# Patient Record
Sex: MALE | Race: WHITE | Employment: OTHER | URBAN - NONMETROPOLITAN AREA
[De-identification: names, ages, dates, MRNs, and addresses within clinical notes are randomized per-mention and may not be internally consistent; named-entity substitution may affect disease eponyms.]

---

## 2019-04-03 LAB
ALBUMIN SERPL-MCNC: NORMAL G/DL
ALP BLD-CCNC: NORMAL U/L
ALT SERPL-CCNC: NORMAL U/L
ANION GAP SERPL CALCULATED.3IONS-SCNC: NORMAL MMOL/L
AST SERPL-CCNC: NORMAL U/L
BILIRUB SERPL-MCNC: NORMAL MG/DL
BUN BLDV-MCNC: NORMAL MG/DL
CALCIUM SERPL-MCNC: NORMAL MG/DL
CHLORIDE BLD-SCNC: NORMAL MMOL/L
CHOLESTEROL, TOTAL: NORMAL
CHOLESTEROL/HDL RATIO: NORMAL
CO2: NORMAL
CREAT SERPL-MCNC: NORMAL MG/DL
GFR CALCULATED: NORMAL
GLUCOSE BLD-MCNC: NORMAL MG/DL
HDLC SERPL-MCNC: NORMAL MG/DL
LDL CHOLESTEROL CALCULATED: NORMAL
POTASSIUM SERPL-SCNC: NORMAL MMOL/L
SODIUM BLD-SCNC: NORMAL MMOL/L
TOTAL PROTEIN: NORMAL
TRIGL SERPL-MCNC: NORMAL MG/DL
VLDLC SERPL CALC-MCNC: NORMAL MG/DL

## 2019-09-11 ENCOUNTER — OFFICE VISIT (OUTPATIENT)
Dept: FAMILY MEDICINE CLINIC | Age: 79
End: 2019-09-11
Payer: MEDICARE

## 2019-09-11 VITALS
WEIGHT: 182 LBS | DIASTOLIC BLOOD PRESSURE: 82 MMHG | SYSTOLIC BLOOD PRESSURE: 148 MMHG | BODY MASS INDEX: 26.96 KG/M2 | TEMPERATURE: 97.9 F | HEART RATE: 65 BPM | OXYGEN SATURATION: 95 % | HEIGHT: 69 IN

## 2019-09-11 DIAGNOSIS — H61.22 IMPACTED CERUMEN OF LEFT EAR: Primary | ICD-10-CM

## 2019-09-11 PROCEDURE — 90653 IIV ADJUVANT VACCINE IM: CPT | Performed by: PHYSICIAN ASSISTANT

## 2019-09-11 PROCEDURE — G0008 ADMIN INFLUENZA VIRUS VAC: HCPCS | Performed by: PHYSICIAN ASSISTANT

## 2019-09-11 PROCEDURE — 69209 REMOVE IMPACTED EAR WAX UNI: CPT | Performed by: PHYSICIAN ASSISTANT

## 2019-09-11 PROCEDURE — 99212 OFFICE O/P EST SF 10 MIN: CPT | Performed by: PHYSICIAN ASSISTANT

## 2019-10-01 ENCOUNTER — OFFICE VISIT (OUTPATIENT)
Dept: FAMILY MEDICINE CLINIC | Age: 79
End: 2019-10-01
Payer: MEDICARE

## 2019-10-01 VITALS
SYSTOLIC BLOOD PRESSURE: 158 MMHG | DIASTOLIC BLOOD PRESSURE: 68 MMHG | TEMPERATURE: 97.3 F | HEART RATE: 89 BPM | WEIGHT: 177.8 LBS | BODY MASS INDEX: 25.45 KG/M2 | OXYGEN SATURATION: 94 % | HEIGHT: 70 IN

## 2019-10-01 DIAGNOSIS — J43.1 PANLOBULAR EMPHYSEMA (HCC): ICD-10-CM

## 2019-10-01 DIAGNOSIS — I15.8 OTHER SECONDARY HYPERTENSION: Primary | ICD-10-CM

## 2019-10-01 DIAGNOSIS — C61 PROSTATE CANCER (HCC): ICD-10-CM

## 2019-10-01 DIAGNOSIS — E78.41 ELEVATED LIPOPROTEIN(A): ICD-10-CM

## 2019-10-01 PROCEDURE — 99214 OFFICE O/P EST MOD 30 MIN: CPT | Performed by: FAMILY MEDICINE

## 2019-10-01 RX ORDER — DILTIAZEM HYDROCHLORIDE 180 MG/1
180 CAPSULE, EXTENDED RELEASE ORAL DAILY
Qty: 90 CAPSULE | Refills: 1 | Status: SHIPPED | OUTPATIENT
Start: 2019-10-01 | End: 2019-10-01

## 2019-10-01 RX ORDER — ASPIRIN 325 MG
325 TABLET ORAL DAILY
COMMUNITY

## 2019-10-01 RX ORDER — DILTIAZEM HYDROCHLORIDE 240 MG/1
240 CAPSULE, COATED, EXTENDED RELEASE ORAL DAILY
Qty: 90 CAPSULE | Refills: 1 | Status: SHIPPED | OUTPATIENT
Start: 2019-10-01 | End: 2019-11-11

## 2019-10-01 RX ORDER — BENAZEPRIL HYDROCHLORIDE 20 MG/1
20 TABLET ORAL DAILY
Qty: 90 TABLET | Refills: 1 | Status: SHIPPED
Start: 2019-10-01 | End: 2020-03-25 | Stop reason: SDUPTHER

## 2019-10-01 RX ORDER — PREDNISONE 10 MG/1
10 TABLET ORAL DAILY
Refills: 0 | COMMUNITY
Start: 2019-09-26 | End: 2019-12-04 | Stop reason: SDUPTHER

## 2019-10-01 RX ORDER — PRAVASTATIN SODIUM 40 MG
40 TABLET ORAL DAILY
Qty: 90 TABLET | Refills: 1 | Status: SHIPPED | OUTPATIENT
Start: 2019-10-01 | End: 2019-10-02 | Stop reason: SDUPTHER

## 2019-10-01 ASSESSMENT — ENCOUNTER SYMPTOMS
ABDOMINAL PAIN: 0
SINUS PAIN: 0
COUGH: 1
VOMITING: 0
DIARRHEA: 0
CONSTIPATION: 0
SHORTNESS OF BREATH: 1
EYE REDNESS: 0
PHOTOPHOBIA: 0
BLOOD IN STOOL: 0
WHEEZING: 1
BACK PAIN: 0
TROUBLE SWALLOWING: 0
SORE THROAT: 0

## 2019-10-01 ASSESSMENT — PATIENT HEALTH QUESTIONNAIRE - PHQ9
SUM OF ALL RESPONSES TO PHQ QUESTIONS 1-9: 0
1. LITTLE INTEREST OR PLEASURE IN DOING THINGS: 0
2. FEELING DOWN, DEPRESSED OR HOPELESS: 0
SUM OF ALL RESPONSES TO PHQ QUESTIONS 1-9: 0
SUM OF ALL RESPONSES TO PHQ9 QUESTIONS 1 & 2: 0

## 2019-10-02 DIAGNOSIS — E78.41 ELEVATED LIPOPROTEIN(A): ICD-10-CM

## 2019-10-02 RX ORDER — PRAVASTATIN SODIUM 40 MG
40 TABLET ORAL DAILY
Qty: 90 TABLET | Refills: 1 | Status: SHIPPED
Start: 2019-10-02 | End: 2020-03-25 | Stop reason: SDUPTHER

## 2019-11-11 RX ORDER — DILTIAZEM HYDROCHLORIDE 180 MG/1
180 CAPSULE, COATED, EXTENDED RELEASE ORAL DAILY
Qty: 90 CAPSULE | Refills: 1 | Status: SHIPPED
Start: 2019-11-11 | End: 2020-03-25 | Stop reason: SDUPTHER

## 2019-12-05 ENCOUNTER — TELEPHONE (OUTPATIENT)
Dept: FAMILY MEDICINE CLINIC | Age: 79
End: 2019-12-05

## 2019-12-05 RX ORDER — LORAZEPAM 0.5 MG/1
0.5 TABLET ORAL 2 TIMES DAILY
Qty: 60 TABLET | Refills: 0 | OUTPATIENT
Start: 2019-12-05 | End: 2020-01-04

## 2019-12-05 RX ORDER — LORAZEPAM 0.5 MG/1
0.5 TABLET ORAL 2 TIMES DAILY
COMMUNITY
End: 2021-03-11

## 2020-01-22 ENCOUNTER — TELEPHONE (OUTPATIENT)
Dept: FAMILY MEDICINE CLINIC | Age: 80
End: 2020-01-22

## 2020-02-04 ENCOUNTER — TELEPHONE (OUTPATIENT)
Dept: FAMILY MEDICINE CLINIC | Age: 80
End: 2020-02-04

## 2020-02-04 RX ORDER — FUROSEMIDE 20 MG/1
20 TABLET ORAL DAILY
Qty: 60 TABLET | Refills: 1 | Status: SHIPPED
Start: 2020-02-04 | End: 2020-04-10 | Stop reason: SDUPTHER

## 2020-02-04 NOTE — TELEPHONE ENCOUNTER
Patient notified. He states he hasn't taken it in awhile since he only takes it when needed. Patient states he is currently having swelling in his legs and feet. It comes and goes and he states he was told to take Lasix as needed for this. Patient states he is unable to come in until March because it is flu season and he has copd. He wants to know what you want him to do?    Please advise, thank you

## 2020-03-06 ENCOUNTER — TELEPHONE (OUTPATIENT)
Dept: FAMILY MEDICINE CLINIC | Age: 80
End: 2020-03-06

## 2020-03-06 ENCOUNTER — OFFICE VISIT (OUTPATIENT)
Dept: FAMILY MEDICINE CLINIC | Age: 80
End: 2020-03-06
Payer: MEDICARE

## 2020-03-06 VITALS
SYSTOLIC BLOOD PRESSURE: 132 MMHG | WEIGHT: 181.5 LBS | TEMPERATURE: 97.4 F | OXYGEN SATURATION: 94 % | HEART RATE: 79 BPM | BODY MASS INDEX: 26.42 KG/M2 | DIASTOLIC BLOOD PRESSURE: 66 MMHG

## 2020-03-06 PROBLEM — J44.9 COPD (CHRONIC OBSTRUCTIVE PULMONARY DISEASE) (HCC): Status: ACTIVE | Noted: 2020-03-06

## 2020-03-06 PROCEDURE — 99214 OFFICE O/P EST MOD 30 MIN: CPT | Performed by: FAMILY MEDICINE

## 2020-03-06 PROCEDURE — 96372 THER/PROPH/DIAG INJ SC/IM: CPT | Performed by: FAMILY MEDICINE

## 2020-03-06 RX ORDER — CEPHALEXIN 500 MG/1
500 CAPSULE ORAL 3 TIMES DAILY
Qty: 21 CAPSULE | Refills: 0 | Status: SHIPPED | OUTPATIENT
Start: 2020-03-06 | End: 2020-03-13

## 2020-03-06 RX ORDER — CEFTRIAXONE 1 G/1
1 INJECTION, POWDER, FOR SOLUTION INTRAMUSCULAR; INTRAVENOUS ONCE
Status: COMPLETED | OUTPATIENT
Start: 2020-03-06 | End: 2020-03-06

## 2020-03-06 RX ADMIN — CEFTRIAXONE 1 G: 1 INJECTION, POWDER, FOR SOLUTION INTRAMUSCULAR; INTRAVENOUS at 15:17

## 2020-03-06 ASSESSMENT — PATIENT HEALTH QUESTIONNAIRE - PHQ9
SUM OF ALL RESPONSES TO PHQ9 QUESTIONS 1 & 2: 0
1. LITTLE INTEREST OR PLEASURE IN DOING THINGS: 0
2. FEELING DOWN, DEPRESSED OR HOPELESS: 0
SUM OF ALL RESPONSES TO PHQ QUESTIONS 1-9: 0
SUM OF ALL RESPONSES TO PHQ QUESTIONS 1-9: 0

## 2020-03-06 NOTE — PROGRESS NOTES
Problem Relation Age of Onset    High Blood Pressure Mother     Heart Attack Mother     Other Mother         blood clots     Emphysema Father     Diabetes Sister     Diabetes Brother     High Blood Pressure Brother     Diabetes Brother     High Blood Pressure Brother      Social History     Tobacco History     Smoking Status  Former Smoker Quit date  3/1/2003 Smoking Frequency  1 pack/day for 30 years (30 pk yrs) Smoking Tobacco Type  Cigarettes    Smokeless Tobacco Use  Never Used          Alcohol History     Alcohol Use Status  Not Currently          Drug Use     Drug Use Status  Never          Sexual Activity     Sexually Active  Not Currently Partners  Female                OBJECTIVE  Vitals:    03/06/20 1309   BP: 132/66   Site: Left Upper Arm   Position: Sitting   Pulse: 79   Temp: 97.4 °F (36.3 °C)   TempSrc: Temporal   SpO2: 94%   Weight: 181 lb 8 oz (82.3 kg)        Body mass index is 26.42 kg/m². No orders of the defined types were placed in this encounter. EXAM   Physical Exam  Vitals signs and nursing note reviewed. Constitutional:       Appearance: Normal appearance. He is normal weight. Neck:      Musculoskeletal: Normal range of motion and neck supple. Cardiovascular:      Rate and Rhythm: Normal rate and regular rhythm. Pulmonary:      Effort: Pulmonary effort is normal.      Comments: On O2, severe obstruction, breathing comfortably. No JVD  Musculoskeletal:      Right lower leg: No edema. Left lower leg: No edema. Comments: Skin thin and shiny reddened, tender, slightly warm. Right noticeably more swollen than left. Some tinea between toes. Neurological:      Mental Status: He is alert. Kandi Polanco was seen today for leg swelling. Diagnoses and all orders for this visit:    Localized edema  -     Cancel: US DUP LOWER EXTREMITY LEFT BEVERLY; Future  -     Cancel: US DUP LOWER EXTREMITY RIGHT BEVERLY;  Future  -     Cancel: US DUP LOWER EXTREMITY RIGHT BEVERLY; Future  U/s ordered due to assymetry of swelling. U/s negative for DVT either side  Panlobular emphysema (HCC)Gold 4. on O2 more than 10 years  Cellulitis of lower extremity, unspecified laterality  -     Cancel: US DUP LOWER EXTREMITY LEFT BEVERLY; Future  -     Cancel: US DUP LOWER EXTREMITY RIGHT BEVERLY; Future  -     Cancel: US DUP LOWER EXTREMITY RIGHT BEVERLY; Future  -     cefTRIAXone (ROCEPHIN) injection 1 g  -     cephALEXin (KEFLEX) 500 MG capsule; Take 1 capsule by mouth 3 times daily for 7 days    Warning signs discussed    No follow-ups on file.     Electronically signed by Latisha Sky MD on 3/6/20 at 1:18 PM

## 2020-03-25 RX ORDER — DILTIAZEM HYDROCHLORIDE 180 MG/1
180 CAPSULE, COATED, EXTENDED RELEASE ORAL DAILY
Qty: 90 CAPSULE | Refills: 1 | Status: SHIPPED
Start: 2020-03-25 | End: 2020-10-01

## 2020-03-25 RX ORDER — PRAVASTATIN SODIUM 40 MG
40 TABLET ORAL DAILY
Qty: 90 TABLET | Refills: 1 | Status: SHIPPED | OUTPATIENT
Start: 2020-03-25 | End: 2021-03-11

## 2020-03-25 RX ORDER — BENAZEPRIL HYDROCHLORIDE 20 MG/1
20 TABLET ORAL DAILY
Qty: 90 TABLET | Refills: 1 | Status: SHIPPED
Start: 2020-03-25 | End: 2020-06-04

## 2020-04-10 RX ORDER — FUROSEMIDE 20 MG/1
20 TABLET ORAL DAILY
Qty: 60 TABLET | Refills: 2 | Status: SHIPPED | OUTPATIENT
Start: 2020-04-10 | End: 2021-03-11

## 2020-05-01 ENCOUNTER — OFFICE VISIT (OUTPATIENT)
Dept: PODIATRY | Age: 80
End: 2020-05-01
Payer: MEDICARE

## 2020-05-01 VITALS
WEIGHT: 177 LBS | HEIGHT: 70 IN | HEART RATE: 75 BPM | TEMPERATURE: 97.5 F | BODY MASS INDEX: 25.34 KG/M2 | OXYGEN SATURATION: 98 %

## 2020-05-01 PROBLEM — R60.9 VENOUS STASIS ULCER OF LEFT LOWER LEG WITH EDEMA OF LEFT LOWER LEG (HCC): Status: ACTIVE | Noted: 2020-05-01

## 2020-05-01 PROBLEM — R60.0 VENOUS STASIS ULCER OF LEFT LOWER LEG WITH EDEMA OF LEFT LOWER LEG (HCC): Status: ACTIVE | Noted: 2020-05-01

## 2020-05-01 PROBLEM — L97.921 NON-PRESSURE CHRONIC ULCER LEFT LOWER LEG, LIMITED TO BREAKDOWN SKIN (HCC): Status: ACTIVE | Noted: 2020-05-01

## 2020-05-01 PROBLEM — I83.029 VENOUS STASIS ULCER OF LEFT LOWER LEG WITH EDEMA OF LEFT LOWER LEG (HCC): Status: ACTIVE | Noted: 2020-05-01

## 2020-05-01 PROBLEM — I83.892 VENOUS STASIS ULCER OF LEFT LOWER LEG WITH EDEMA OF LEFT LOWER LEG (HCC): Status: ACTIVE | Noted: 2020-05-01

## 2020-05-01 PROBLEM — L97.929 VENOUS STASIS ULCER OF LEFT LOWER LEG WITH EDEMA OF LEFT LOWER LEG (HCC): Status: ACTIVE | Noted: 2020-05-01

## 2020-05-01 PROBLEM — I87.2 VENOUS INSUFFICIENCY (CHRONIC) (PERIPHERAL): Status: ACTIVE | Noted: 2020-05-01

## 2020-05-01 PROCEDURE — 29580 STRAPPING UNNA BOOT: CPT | Performed by: PODIATRIST

## 2020-05-01 PROCEDURE — 99203 OFFICE O/P NEW LOW 30 MIN: CPT | Performed by: PODIATRIST

## 2020-05-01 RX ORDER — LEVOFLOXACIN 500 MG/1
500 TABLET, FILM COATED ORAL DAILY
Qty: 10 TABLET | Refills: 0 | Status: SHIPPED | OUTPATIENT
Start: 2020-05-01 | End: 2020-05-11

## 2020-05-01 RX ORDER — TRIAMCINOLONE ACETONIDE 1 MG/G
CREAM TOPICAL
Qty: 90 G | Refills: 2 | Status: SHIPPED | OUTPATIENT
Start: 2020-05-01 | End: 2021-03-11

## 2020-05-01 NOTE — PROGRESS NOTES
20     Cindy Doyle    : 1940 Sex: male   Age: 68 y.o. Patient was referred by: None  Patient's PCP/Provider is:  Subhash Mata MD    Subjective:    Patient seen today for wound evaluation left leg. Chief Complaint   Patient presents with    Wound Check     bilateral leg wound       HPI: Patient has had issues with blistering and wounds to both lower extremities. He currently has issues on his left leg. Concerned due to increased swelling and drainage from both sites. He denies any nausea, vomiting, fever and/or chills. ROS:  Const: Positives and pertinent negatives as per HPI. Musculo: Denies symptoms other than stated above. Neuro: Denies symptoms other than stated above. Skin: Denies symptoms other than stated above. Current Medications:    Current Outpatient Medications:     triamcinolone (KENALOG) 0.1 % cream, Apply topically 2 times daily. , Disp: 90 g, Rfl: 2    levoFLOXacin (LEVAQUIN) 500 MG tablet, Take 1 tablet by mouth daily for 10 days, Disp: 10 tablet, Rfl: 0    furosemide (LASIX) 20 MG tablet, Take 1 tablet by mouth daily When symptoms of edema present, Disp: 60 tablet, Rfl: 2    benazepril (LOTENSIN) 20 MG tablet, Take 1 tablet by mouth daily, Disp: 90 tablet, Rfl: 1    dilTIAZem (CARDIZEM CD) 180 MG extended release capsule, Take 1 capsule by mouth daily, Disp: 90 capsule, Rfl: 1    pravastatin (PRAVACHOL) 40 MG tablet, Take 1 tablet by mouth daily, Disp: 90 tablet, Rfl: 1    albuterol sulfate (PROAIR RESPICLICK) 323 (90 Base) MCG/ACT aerosol powder inhalation, Inhale 2 puffs into the lungs every 6 hours as needed for Shortness of Breath, Disp: 3 Inhaler, Rfl: 3    LORazepam (ATIVAN) 0.5 MG tablet, Take 0.5 mg by mouth 2 times daily.  As needed, Disp: , Rfl:     aspirin 325 MG tablet, Take 325 mg by mouth daily, Disp: , Rfl:     aclidinium (TUDORZA PRESSAIR) 400 MCG/ACT AEPB inhaler, Inhale 1 puff into the lungs 2 times daily, Disp: , Rfl:    mometasone-formoterol (DULERA) 200-5 MCG/ACT inhaler, Inhale 2 puffs into the lungs every 12 hours, Disp: , Rfl:     Allergies: Allergies   Allergen Reactions    Apixaban     Rivaroxaban        Vitals:    20 0931   Pulse: 75   Temp: 97.5 °F (36.4 °C)   SpO2: 98%   Weight: 177 lb (80.3 kg)   Height: 5' 9.5\" (1.765 m)        Past Medical History:   Diagnosis Date    COPD (chronic obstructive pulmonary disease) ([facility]     Ischemic colitis ([facility]     Prostate cancer ([facility]     x2 years      Family History   Problem Relation Age of Onset    High Blood Pressure Mother     Heart Attack Mother     Other Mother         blood clots     Emphysema Father     Diabetes Sister     Diabetes Brother     High Blood Pressure Brother     Diabetes Brother     High Blood Pressure Brother      Past Surgical History:   Procedure Laterality Date    COLONOSCOPY      Dr. Jake Carter      Social History     Tobacco Use    Smoking status: Former Smoker     Packs/day: 1.00     Years: 30.00     Pack years: 30.00     Types: Cigarettes     Last attempt to quit: 3/1/2003     Years since quittin.1    Smokeless tobacco: Never Used   Substance Use Topics    Alcohol use: Not Currently    Drug use: Never           Diagnostic studies:    No results found. Procedures: An unna boot  compressive wrap was applied to the left lower extremity. It's purpose is to  decrease  the amount of edema present, and to allow proper healing of the soft tissues. The patient was instructed to keep the unna boot clean, dry and intact until the next follow up visit. The patient was instructed to look for signs of redness, irritation, blistering and pain. If these or any other symptoms were to develop, they were advised to contact the office immediately for reevaluation. Exam:  VASCULAR: Pedal pulses palpable bilateral foot. CFT < 5 seconds digits 1-5 bilateral foot.   NEUROLOGICAL: Epicritic sensations intact bilateral

## 2020-05-06 ENCOUNTER — OFFICE VISIT (OUTPATIENT)
Dept: PODIATRY | Age: 80
End: 2020-05-06
Payer: MEDICARE

## 2020-05-06 VITALS — WEIGHT: 177 LBS | BODY MASS INDEX: 26.22 KG/M2 | HEIGHT: 69 IN

## 2020-05-06 PROCEDURE — 99213 OFFICE O/P EST LOW 20 MIN: CPT | Performed by: PODIATRIST

## 2020-06-04 RX ORDER — BENAZEPRIL HYDROCHLORIDE 20 MG/1
TABLET ORAL
Qty: 90 TABLET | Refills: 3 | Status: SHIPPED | OUTPATIENT
Start: 2020-06-04 | End: 2021-03-11

## 2020-07-20 ENCOUNTER — TELEPHONE (OUTPATIENT)
Dept: ADMINISTRATIVE | Age: 80
End: 2020-07-20

## 2020-07-21 ENCOUNTER — HOSPITAL ENCOUNTER (OUTPATIENT)
Age: 80
Discharge: HOME | End: 2020-07-23
Payer: MEDICARE

## 2020-07-21 LAB
ALBUMIN SERPL-MCNC: 4.2 G/DL (ref 3.5–5.2)
ALP BLD-CCNC: 68 U/L (ref 40–129)
ALT SERPL-CCNC: 10 U/L (ref 0–40)
ANION GAP SERPL CALCULATED.3IONS-SCNC: 11 MMOL/L (ref 7–16)
AST SERPL-CCNC: 15 U/L (ref 0–39)
BASOPHILS ABSOLUTE: 0.04 E9/L (ref 0–0.2)
BASOPHILS RELATIVE PERCENT: 0.5 % (ref 0–2)
BILIRUB SERPL-MCNC: 0.4 MG/DL (ref 0–1.2)
BUN BLDV-MCNC: 21 MG/DL (ref 8–23)
CALCIUM SERPL-MCNC: 9.4 MG/DL (ref 8.6–10.2)
CHLORIDE BLD-SCNC: 99 MMOL/L (ref 98–107)
CHOLESTEROL, TOTAL: 231 MG/DL (ref 0–199)
CO2: 31 MMOL/L (ref 22–29)
CREAT SERPL-MCNC: 0.7 MG/DL (ref 0.7–1.2)
EOSINOPHILS ABSOLUTE: 0.16 E9/L (ref 0.05–0.5)
EOSINOPHILS RELATIVE PERCENT: 1.9 % (ref 0–6)
GFR AFRICAN AMERICAN: >60
GFR NON-AFRICAN AMERICAN: >60 ML/MIN/1.73
GLUCOSE BLD-MCNC: 101 MG/DL (ref 74–99)
HCT VFR BLD CALC: 39.8 % (ref 37–54)
HDLC SERPL-MCNC: 65 MG/DL
HEMOGLOBIN: 12 G/DL (ref 12.5–16.5)
IMMATURE GRANULOCYTES #: 0.02 E9/L
IMMATURE GRANULOCYTES %: 0.2 % (ref 0–5)
LDL CHOLESTEROL CALCULATED: 144 MG/DL (ref 0–99)
LYMPHOCYTES ABSOLUTE: 3.47 E9/L (ref 1.5–4)
LYMPHOCYTES RELATIVE PERCENT: 41.2 % (ref 20–42)
MCH RBC QN AUTO: 29.6 PG (ref 26–35)
MCHC RBC AUTO-ENTMCNC: 30.2 % (ref 32–34.5)
MCV RBC AUTO: 98 FL (ref 80–99.9)
MONOCYTES ABSOLUTE: 0.88 E9/L (ref 0.1–0.95)
MONOCYTES RELATIVE PERCENT: 10.4 % (ref 2–12)
NEUTROPHILS ABSOLUTE: 3.86 E9/L (ref 1.8–7.3)
NEUTROPHILS RELATIVE PERCENT: 45.8 % (ref 43–80)
PDW BLD-RTO: 14.6 FL (ref 11.5–15)
PLATELET # BLD: 281 E9/L (ref 130–450)
PMV BLD AUTO: 10.6 FL (ref 7–12)
POTASSIUM SERPL-SCNC: 4.6 MMOL/L (ref 3.5–5)
RBC # BLD: 4.06 E12/L (ref 3.8–5.8)
SODIUM BLD-SCNC: 141 MMOL/L (ref 132–146)
TOTAL PROTEIN: 6.7 G/DL (ref 6.4–8.3)
TRIGL SERPL-MCNC: 111 MG/DL (ref 0–149)
TSH SERPL DL<=0.05 MIU/L-ACNC: 2.64 UIU/ML (ref 0.27–4.2)
VLDLC SERPL CALC-MCNC: 22 MG/DL
WBC # BLD: 8.4 E9/L (ref 4.5–11.5)

## 2020-07-21 PROCEDURE — 80061 LIPID PANEL: CPT

## 2020-07-21 PROCEDURE — 85025 COMPLETE CBC W/AUTO DIFF WBC: CPT

## 2020-07-21 PROCEDURE — 36415 COLL VENOUS BLD VENIPUNCTURE: CPT

## 2020-07-21 PROCEDURE — 80053 COMPREHEN METABOLIC PANEL: CPT

## 2020-07-21 PROCEDURE — 84443 ASSAY THYROID STIM HORMONE: CPT

## 2020-07-24 ENCOUNTER — OFFICE VISIT (OUTPATIENT)
Dept: FAMILY MEDICINE CLINIC | Age: 80
End: 2020-07-24
Payer: MEDICARE

## 2020-07-24 VITALS
SYSTOLIC BLOOD PRESSURE: 172 MMHG | HEART RATE: 74 BPM | DIASTOLIC BLOOD PRESSURE: 88 MMHG | WEIGHT: 179.2 LBS | OXYGEN SATURATION: 99 % | BODY MASS INDEX: 26.08 KG/M2 | TEMPERATURE: 97 F

## 2020-07-24 PROCEDURE — 99214 OFFICE O/P EST MOD 30 MIN: CPT | Performed by: FAMILY MEDICINE

## 2020-07-24 ASSESSMENT — ENCOUNTER SYMPTOMS
SINUS PAIN: 0
WHEEZING: 0
EYE REDNESS: 0
VOMITING: 0
CONSTIPATION: 0
PHOTOPHOBIA: 0
TROUBLE SWALLOWING: 0
ABDOMINAL PAIN: 0
SORE THROAT: 0
BACK PAIN: 0
COUGH: 0
BLOOD IN STOOL: 0
DIARRHEA: 0

## 2020-07-24 NOTE — PROGRESS NOTES
OFFICE NOTE    7/24/20  Name: Sydnee Bonner  ILS:[id number]   Sex:male   Age:80 y.o. SUBJECTIVE  Chief Complaint   Patient presents with    Hypertension    Congestive Heart Failure       Patient presents for routine follow up. Would like to discuss medication change due to possible side effects of benazepril. Also had blood work completed prior to appointment to review. Denies need for medication refills. Says has been more active last 6 mos. Able to sweep house which he was uanble to do before. He and his wife are trying to avoid going out due to CoVID        Review of Systems   Constitutional: Positive for fatigue. Negative for appetite change, fever and unexpected weight change. HENT: Negative for congestion, ear pain, hearing loss, sinus pain, sore throat and trouble swallowing. Eyes: Negative for photophobia, redness and visual disturbance. Respiratory: Positive for shortness of breath. Negative for cough and wheezing. Cardiovascular: Negative for chest pain, palpitations and leg swelling. Gastrointestinal: Negative for abdominal pain, blood in stool, constipation, diarrhea and vomiting. Endocrine: Negative for cold intolerance, polydipsia and polyuria. Genitourinary: Negative for difficulty urinating, genital sores, hematuria and urgency. Musculoskeletal: Positive for arthralgias. Negative for back pain and joint swelling. Skin: Negative for pallor and rash. Allergic/Immunologic: Negative for food allergies. Neurological: Negative for dizziness, tremors, seizures, syncope, numbness and headaches. Hematological: Negative for adenopathy. Does not bruise/bleed easily. Psychiatric/Behavioral: Negative for agitation, dysphoric mood, hallucinations and sleep disturbance.             Current Outpatient Medications:     mometasone-formoterol (DULERA) 200-5 MCG/ACT inhaler, Inhale 2 puffs into the lungs every 12 hours, Disp: 3 Inhaler, Rfl: 3    benazepril (LOTENSIN) 20 MG tablet, Active  Not Currently Partners  Female              OBJECTIVE  Vitals:    07/24/20 1048 07/24/20 1157   BP: (!) 168/82 (!) 172/88   Site: Left Upper Arm Left Upper Arm   Position: Sitting Sitting   Pulse: 74    Temp: 97 °F (36.1 °C)    TempSrc: Temporal    SpO2: 99%    Weight: 179 lb 3.2 oz (81.3 kg)         Body mass index is 26.08 kg/m². Patient is normal weight    No orders of the defined types were placed in this encounter. EXAM   Physical Exam  Vitals signs and nursing note reviewed. Constitutional:       Appearance: Normal appearance. He is well-developed and normal weight. HENT:      Right Ear: Tympanic membrane, ear canal and external ear normal.      Left Ear: Tympanic membrane, ear canal and external ear normal.      Nose: Nose normal. No congestion. Mouth/Throat:      Pharynx: Oropharynx is clear. Eyes:      General: No scleral icterus. Conjunctiva/sclera: Conjunctivae normal.      Pupils: Pupils are equal, round, and reactive to light. Neck:      Musculoskeletal: Neck supple. Thyroid: No thyroid mass or thyromegaly. Vascular: No carotid bruit or JVD. Trachea: Trachea normal.   Cardiovascular:      Rate and Rhythm: Normal rate and regular rhythm. Pulses: Normal pulses. Heart sounds: Normal heart sounds. No murmur. No gallop. Pulmonary:      Effort: Pulmonary effort is normal.      Breath sounds: Normal breath sounds. No wheezing or rales. Comments: Wears O2. Pursed lip breathing. Moderate obstruction, no rales or wheezes  Abdominal:      General: Bowel sounds are normal. There is no distension. Palpations: Abdomen is soft. There is no mass. Tenderness: There is no abdominal tenderness. There is no guarding. Musculoskeletal: Normal range of motion. General: No swelling or tenderness. Comments: Trace edema. Moderate stasis changes. Ulcer resolved   Lymphadenopathy:      Cervical: No cervical adenopathy.    Skin:     General: Skin is warm and dry. Coloration: Skin is not jaundiced or pale. Findings: Bruising present. No rash. Neurological:      General: No focal deficit present. Mental Status: He is alert and oriented to person, place, and time. Sensory: No sensory deficit. Motor: Weakness present. No abnormal muscle tone. Coordination: Coordination normal.   Psychiatric:         Behavior: Behavior normal.           Ashly Alfaro was seen today for hypertension and congestive heart failure. Diagnoses and all orders for this visit:    Prostate cancer [facility])  Follows with Urology no concerns at this point  Venous stasis ulcer of left lower leg with edema of left lower leg (HCC)  Moderate stasis changes, only trace edema. Seems to be doing pretty well. Compression stockings probably not very high compression would be best.  Panlobular emphysema (HCC)  Has adapted well and has been on oxygen more than 10 years. Pulmonary follows. And seems meds opitimized    Elevated lipoprotein(a)  Says he gets cramps. Has been out they resolved. Told him he can stay off this. BP typically well controlled at home. His concerns about Benzapril seem unlikely to be due to med. Agreed to stay on and will call us some readings        Return in about 6 months (around 1/24/2021). Electronically signed by Margarita Khan MD on 7/24/20 at 11:25 AM EDT    I have personally reviewed and updated the chief complaint, HPI, Past Medical, Family and Social History, as well as the above Review of Systems.

## 2020-07-26 ASSESSMENT — ENCOUNTER SYMPTOMS: SHORTNESS OF BREATH: 1

## 2020-09-15 ENCOUNTER — OFFICE VISIT (OUTPATIENT)
Dept: FAMILY MEDICINE CLINIC | Age: 80
End: 2020-09-15
Payer: MEDICARE

## 2020-09-15 VITALS
OXYGEN SATURATION: 99 % | WEIGHT: 176.8 LBS | BODY MASS INDEX: 25.73 KG/M2 | SYSTOLIC BLOOD PRESSURE: 162 MMHG | HEART RATE: 69 BPM | DIASTOLIC BLOOD PRESSURE: 78 MMHG | TEMPERATURE: 96.6 F

## 2020-09-15 PROCEDURE — 99214 OFFICE O/P EST MOD 30 MIN: CPT | Performed by: FAMILY MEDICINE

## 2020-09-15 RX ORDER — VALSARTAN 160 MG/1
160 TABLET ORAL DAILY
Qty: 30 TABLET | Refills: 5 | Status: SHIPPED
Start: 2020-09-15 | End: 2021-03-08 | Stop reason: SDUPTHER

## 2020-09-15 RX ORDER — CEPHALEXIN 500 MG/1
500 CAPSULE ORAL 2 TIMES DAILY
Qty: 14 CAPSULE | Refills: 0 | Status: SHIPPED | OUTPATIENT
Start: 2020-09-15 | End: 2020-09-22

## 2020-09-15 ASSESSMENT — ENCOUNTER SYMPTOMS
ABDOMINAL PAIN: 0
BACK PAIN: 0
CONSTIPATION: 0
TROUBLE SWALLOWING: 0
WHEEZING: 0
DIARRHEA: 0
COUGH: 0
SORE THROAT: 0
SINUS PAIN: 0
VOMITING: 0
PHOTOPHOBIA: 0
EYE REDNESS: 0
BLOOD IN STOOL: 0

## 2020-09-15 NOTE — PROGRESS NOTES
OFFICE NOTE    9/15/20  Name: Chano Butt  [id number]   Sex:male   Age:80 y.o. SUBJECTIVE  Chief Complaint   Patient presents with    Discuss Medications     wants to change BP meds    Blister     BLE, painful       Patient presents for ongoing issues with BLE. Remain red and blistering. Has visible blisters on R shin and scabbed areas that he reports were blisters that had popped. He believes this is due to his mediction as he \"never had them before he started that medication in the hospital.\"  Some confursion. Believes Diltiazem is new, also on ACE         Review of Systems   Constitutional: Positive for fatigue. Negative for appetite change, fever and unexpected weight change. HENT: Negative for congestion, ear pain, hearing loss, sinus pain, sore throat and trouble swallowing. Eyes: Negative for photophobia, redness and visual disturbance. Respiratory: Positive for shortness of breath. Negative for cough and wheezing. Cardiovascular: Positive for leg swelling. Negative for chest pain and palpitations. Gastrointestinal: Negative for abdominal pain, blood in stool, constipation, diarrhea and vomiting. Endocrine: Negative for cold intolerance, polydipsia and polyuria. Genitourinary: Negative for difficulty urinating, genital sores, hematuria and urgency. Musculoskeletal: Negative for arthralgias, back pain and joint swelling. Skin:        Fluid filled blisters   Allergic/Immunologic: Negative for food allergies. Neurological: Negative for dizziness, tremors, seizures, syncope, numbness and headaches. Hematological: Negative for adenopathy. Does not bruise/bleed easily. Psychiatric/Behavioral: Negative for agitation, dysphoric mood, hallucinations and sleep disturbance. The patient is nervous/anxious.              Current Outpatient Medications:     valsartan (DIOVAN) 160 MG tablet, Take 1 tablet by mouth daily, Disp: 30 tablet, Rfl: 5    cephALEXin (KEFLEX) 500 MG capsule, Take 1 capsule by mouth 2 times daily for 7 days, Disp: 14 capsule, Rfl: 0    mometasone-formoterol (DULERA) 200-5 MCG/ACT inhaler, Inhale 2 puffs into the lungs every 12 hours, Disp: 3 Inhaler, Rfl: 3    benazepril (LOTENSIN) 20 MG tablet, TAKE 1 TABLET DAILY, Disp: 90 tablet, Rfl: 3    triamcinolone (KENALOG) 0.1 % cream, Apply topically 2 times daily. , Disp: 90 g, Rfl: 2    furosemide (LASIX) 20 MG tablet, Take 1 tablet by mouth daily When symptoms of edema present, Disp: 60 tablet, Rfl: 2    dilTIAZem (CARDIZEM CD) 180 MG extended release capsule, Take 1 capsule by mouth daily, Disp: 90 capsule, Rfl: 1    pravastatin (PRAVACHOL) 40 MG tablet, Take 1 tablet by mouth daily, Disp: 90 tablet, Rfl: 1    albuterol sulfate (PROAIR RESPICLICK) 552 (90 Base) MCG/ACT aerosol powder inhalation, Inhale 2 puffs into the lungs every 6 hours as needed for Shortness of Breath, Disp: 3 Inhaler, Rfl: 3    LORazepam (ATIVAN) 0.5 MG tablet, Take 0.5 mg by mouth 2 times daily.  As needed, Disp: , Rfl:     aspirin 325 MG tablet, Take 325 mg by mouth daily, Disp: , Rfl:     aclidinium (TUDORZA PRESSAIR) 400 MCG/ACT AEPB inhaler, Inhale 1 puff into the lungs 2 times daily, Disp: , Rfl:   Allergies   Allergen Reactions    Apixaban     Rivaroxaban        Past Medical History:   Diagnosis Date    [facility]     Ischemic colitis ([facility] 2008    [facility]     x2 years      Past Surgical History:   Procedure Laterality Date    COLONOSCOPY  2009    Dr. Judy Fisher      Family History   Problem Relation Age of Onset    High Blood Pressure Mother     Heart Attack Mother     Other Mother         blood clots     Emphysema Father     Diabetes Sister     Diabetes Brother     High Blood Pressure Brother     Diabetes Brother     High Blood Pressure Brother      Social History     Tobacco History     Smoking Status  Former Smoker Quit date  3/1/2003 Smoking Frequency  1 pack/day for 30 years (30 pk yrs) Smoking Tobacco Type  Cigarettes    Smokeless Tobacco Use  Never Used          Alcohol History     Alcohol Use Status  Not Currently          Drug Use     Drug Use Status  Never          Sexual Activity     Sexually Active  Not Currently Partners  Female              OBJECTIVE  Vitals:    09/15/20 1328   BP: (!) 162/78   Site: Left Upper Arm   Position: Sitting   Pulse: 69   Temp: 96.6 °F (35.9 °C)   TempSrc: Temporal   SpO2: 99%   Weight: 176 lb 12.8 oz (80.2 kg)        Body mass index is 25.73 kg/m². Patient is normal weight    No orders of the defined types were placed in this encounter. EXAM   Physical Exam  Vitals signs and nursing note reviewed. Constitutional:       Appearance: Normal appearance. He is normal weight. HENT:      Right Ear: Tympanic membrane normal.      Left Ear: Tympanic membrane normal.      Mouth/Throat:      Pharynx: Oropharynx is clear. Eyes:      General: No scleral icterus. Conjunctiva/sclera: Conjunctivae normal.      Pupils: Pupils are equal, round, and reactive to light. Neck:      Musculoskeletal: Normal range of motion. Vascular: No carotid bruit. Cardiovascular:      Rate and Rhythm: Normal rate and regular rhythm. Heart sounds: No murmur. Pulmonary:      Breath sounds: No wheezing or rhonchi. Comments: Moderate obstruction. Pursed lip breathing. Wears O2 continuous  Abdominal:      General: Bowel sounds are normal.      Palpations: There is no mass. Tenderness: There is no abdominal tenderness. Musculoskeletal:      Right lower leg: Edema present. Left lower leg: Edema present. Comments: Edema is mild. Moderate stasis changes. Some blistering on right, not pruritic. Surrounding flare   Lymphadenopathy:      Cervical: No cervical adenopathy. Skin:     Coloration: Skin is not jaundiced. Findings: No bruising. Neurological:      Mental Status: He is alert and oriented to person, place, and time.

## 2020-09-16 ASSESSMENT — ENCOUNTER SYMPTOMS: SHORTNESS OF BREATH: 1

## 2020-10-01 RX ORDER — DILTIAZEM HYDROCHLORIDE 180 MG/1
CAPSULE, COATED, EXTENDED RELEASE ORAL
Qty: 90 CAPSULE | Refills: 3 | Status: SHIPPED
Start: 2020-10-01 | End: 2021-09-27

## 2020-10-01 NOTE — TELEPHONE ENCOUNTER
Last Appointment:  9/15/2020  Future Appointments   Date Time Provider Mark Arias   1/26/2021  9:00 AM MD LUDY Damon [facility]   2/24/2021  1:00 PM Elena Mendoza MD [facility]

## 2020-11-19 ENCOUNTER — TELEPHONE (OUTPATIENT)
Dept: FAMILY MEDICINE CLINIC | Age: 80
End: 2020-11-19

## 2020-11-20 VITALS — SYSTOLIC BLOOD PRESSURE: 111 MMHG | DIASTOLIC BLOOD PRESSURE: 72 MMHG

## 2021-03-03 ENCOUNTER — TELEPHONE (OUTPATIENT)
Dept: FAMILY MEDICINE CLINIC | Age: 81
End: 2021-03-03

## 2021-03-03 DIAGNOSIS — I10 ESSENTIAL HYPERTENSION: Primary | ICD-10-CM

## 2021-03-03 NOTE — TELEPHONE ENCOUNTER
Spoke with pt and he would like labs done so that he can discuss at his appt with you on 4/7/21. Please place lab orders. He will have done at [facility].

## 2021-03-03 NOTE — TELEPHONE ENCOUNTER
Spoke with patient, he is aware yearly blood work is not due until July but would just like his routine labs drawn for appt. I have them pended as he states he'll come in around the 15th to have them drawn.

## 2021-03-08 DIAGNOSIS — I10 ESSENTIAL HYPERTENSION: ICD-10-CM

## 2021-03-08 RX ORDER — VALSARTAN 160 MG/1
160 TABLET ORAL DAILY
Qty: 30 TABLET | Refills: 5 | Status: SHIPPED
Start: 2021-03-08 | End: 2021-08-31 | Stop reason: SDUPTHER

## 2021-03-08 NOTE — TELEPHONE ENCOUNTER
Last Appointment:  9/15/2020  Future Appointments   Date Time Provider Mark Arias   3/10/2021  3:15 PM Shemar Taveras MD [facility]   4/7/2021 10:15 AM Elizabeth Burciaga  [address] [facility] [address]

## 2021-03-23 DIAGNOSIS — I10 ESSENTIAL HYPERTENSION: ICD-10-CM

## 2021-03-23 LAB
ALBUMIN SERPL-MCNC: 4.1 G/DL (ref 3.5–5.2)
ALP BLD-CCNC: 75 U/L (ref 40–129)
ALT SERPL-CCNC: 15 U/L (ref 0–40)
ANION GAP SERPL CALCULATED.3IONS-SCNC: 10 MMOL/L (ref 7–16)
AST SERPL-CCNC: 19 U/L (ref 0–39)
BASOPHILS ABSOLUTE: 0.05 E9/L (ref 0–0.2)
BASOPHILS RELATIVE PERCENT: 0.6 % (ref 0–2)
BILIRUB SERPL-MCNC: 0.3 MG/DL (ref 0–1.2)
BUN BLDV-MCNC: 20 MG/DL (ref 8–23)
CALCIUM SERPL-MCNC: 9.1 MG/DL (ref 8.6–10.2)
CHLORIDE BLD-SCNC: 101 MMOL/L (ref 98–107)
CHOLESTEROL, TOTAL: 251 MG/DL (ref 0–199)
CO2: 32 MMOL/L (ref 22–29)
CREAT SERPL-MCNC: 0.7 MG/DL (ref 0.7–1.2)
EOSINOPHILS ABSOLUTE: 0.12 E9/L (ref 0.05–0.5)
EOSINOPHILS RELATIVE PERCENT: 1.4 % (ref 0–6)
GFR AFRICAN AMERICAN: >60
GFR NON-AFRICAN AMERICAN: >60 ML/MIN/1.73
GLUCOSE BLD-MCNC: 102 MG/DL (ref 74–99)
HCT VFR BLD CALC: 40.3 % (ref 37–54)
HDLC SERPL-MCNC: 65 MG/DL
HEMOGLOBIN: 12.3 G/DL (ref 12.5–16.5)
IMMATURE GRANULOCYTES #: 0.04 E9/L
IMMATURE GRANULOCYTES %: 0.5 % (ref 0–5)
LDL CHOLESTEROL CALCULATED: 161 MG/DL (ref 0–99)
LYMPHOCYTES ABSOLUTE: 3.07 E9/L (ref 1.5–4)
LYMPHOCYTES RELATIVE PERCENT: 36.9 % (ref 20–42)
MCH RBC QN AUTO: 30.3 PG (ref 26–35)
MCHC RBC AUTO-ENTMCNC: 30.5 % (ref 32–34.5)
MCV RBC AUTO: 99.3 FL (ref 80–99.9)
MONOCYTES ABSOLUTE: 0.82 E9/L (ref 0.1–0.95)
MONOCYTES RELATIVE PERCENT: 9.8 % (ref 2–12)
NEUTROPHILS ABSOLUTE: 4.23 E9/L (ref 1.8–7.3)
NEUTROPHILS RELATIVE PERCENT: 50.8 % (ref 43–80)
PDW BLD-RTO: 13.6 FL (ref 11.5–15)
PLATELET # BLD: 290 E9/L (ref 130–450)
PMV BLD AUTO: 10.5 FL (ref 7–12)
POTASSIUM SERPL-SCNC: 4.5 MMOL/L (ref 3.5–5)
RBC # BLD: 4.06 E12/L (ref 3.8–5.8)
SODIUM BLD-SCNC: 143 MMOL/L (ref 132–146)
TOTAL PROTEIN: 6.8 G/DL (ref 6.4–8.3)
TRIGL SERPL-MCNC: 123 MG/DL (ref 0–149)
TSH SERPL DL<=0.05 MIU/L-ACNC: 2.81 UIU/ML (ref 0.27–4.2)
VLDLC SERPL CALC-MCNC: 25 MG/DL
WBC # BLD: 8.3 E9/L (ref 4.5–11.5)

## 2021-04-07 ENCOUNTER — OFFICE VISIT (OUTPATIENT)
Dept: FAMILY MEDICINE CLINIC | Age: 81
End: 2021-04-07
Payer: MEDICARE

## 2021-04-07 VITALS
DIASTOLIC BLOOD PRESSURE: 78 MMHG | OXYGEN SATURATION: 96 % | TEMPERATURE: 96.9 F | BODY MASS INDEX: 28.02 KG/M2 | HEIGHT: 69 IN | SYSTOLIC BLOOD PRESSURE: 146 MMHG | HEART RATE: 73 BPM | WEIGHT: 189.2 LBS

## 2021-04-07 DIAGNOSIS — E78.41 ELEVATED LIPOPROTEIN(A): ICD-10-CM

## 2021-04-07 DIAGNOSIS — Z00.00 ROUTINE GENERAL MEDICAL EXAMINATION AT A HEALTH CARE FACILITY: Primary | ICD-10-CM

## 2021-04-07 DIAGNOSIS — C61 PROSTATE CANCER (HCC): ICD-10-CM

## 2021-04-07 DIAGNOSIS — I87.2 VENOUS INSUFFICIENCY (CHRONIC) (PERIPHERAL): ICD-10-CM

## 2021-04-07 DIAGNOSIS — J43.1 PANLOBULAR EMPHYSEMA (HCC): ICD-10-CM

## 2021-04-07 PROBLEM — L97.921 NON-PRESSURE CHRONIC ULCER LEFT LOWER LEG, LIMITED TO BREAKDOWN SKIN (HCC): Status: RESOLVED | Noted: 2020-05-01 | Resolved: 2021-04-07

## 2021-04-07 PROCEDURE — 99214 OFFICE O/P EST MOD 30 MIN: CPT | Performed by: FAMILY MEDICINE

## 2021-04-07 PROCEDURE — G0439 PPPS, SUBSEQ VISIT: HCPCS | Performed by: FAMILY MEDICINE

## 2021-04-07 ASSESSMENT — ENCOUNTER SYMPTOMS
EYE REDNESS: 0
VOMITING: 0
BLOOD IN STOOL: 0
PHOTOPHOBIA: 0
WHEEZING: 0
COUGH: 0
ABDOMINAL PAIN: 0
DIARRHEA: 0
BACK PAIN: 0
SORE THROAT: 0
TROUBLE SWALLOWING: 0
CONSTIPATION: 0
SINUS PAIN: 0
SHORTNESS OF BREATH: 1

## 2021-04-07 ASSESSMENT — LIFESTYLE VARIABLES
HOW OFTEN DO YOU HAVE A DRINK CONTAINING ALCOHOL: NEVER
AUDIT TOTAL SCORE: INCOMPLETE
AUDIT-C TOTAL SCORE: INCOMPLETE
HOW OFTEN DO YOU HAVE A DRINK CONTAINING ALCOHOL: 0

## 2021-04-07 ASSESSMENT — PATIENT HEALTH QUESTIONNAIRE - PHQ9
SUM OF ALL RESPONSES TO PHQ QUESTIONS 1-9: 0
SUM OF ALL RESPONSES TO PHQ QUESTIONS 1-9: 0
1. LITTLE INTEREST OR PLEASURE IN DOING THINGS: 0

## 2021-04-07 NOTE — PROGRESS NOTES
Medicare Annual Wellness Visit  Name: Prosper Jimenez Date: 2021   MRN: <[id number]> Sex: Male   Age: 80 y.o. Ethnicity: Non-/Non    : 1940 Race: Charo Munoz is here for Medicare AWV and Hypertension    Screenings for behavioral, psychosocial and functional/safety risks, and cognitive dysfunction are all negative except as indicated below. These results, as well as other patient data from the [address] form, are documented in Flowsheets linked to this Encounter. Allergies   Allergen Reactions    Apixaban     Rivaroxaban          Prior to Visit Medications    Medication Sig Taking?  Authorizing Provider   valsartan (DIOVAN) 160 MG tablet Take 1 tablet by mouth daily  Oma Drake MD   albuterol sulfate HFA (PROAIR HFA) 108 (90 Base) MCG/ACT inhaler Inhale 2 puffs into the lungs every 6 hours as needed for Wheezing  Buddy Chaidez MD   dilTIAZem (CARDIZEM CD) 180 MG extended release capsule TAKE 1 CAPSULE DAILY  Oma Drake MD   mometasone-formoterol (DULERA) 200-5 MCG/ACT inhaler Inhale 2 puffs into the lungs every 12 hours  Buddy Chaidez MD   aspirin 325 MG tablet Take 325 mg by mouth daily  Historical Provider, MD   aclidinium (TUDORZA PRESSAIR) 400 MCG/ACT AEPB inhaler Inhale 1 puff into the lungs 2 times daily  Historical Provider, MD         Past Medical History:   Diagnosis Date    [facility] obstructive [facility] disease) [facility] [address]     Ischemic colitis ([facility] [address] 2008    Prostate cancer ([facility] [address]     x2 years        Past Surgical History:   Procedure Laterality Date    COLONOSCOPY      Dr. Saravanan Naik          Family History   Problem Relation Age of Onset    High Blood Pressure Mother     Heart Attack Mother     Other Mother         blood clots     Emphysema Father     Diabetes Sister     Diabetes Brother     High Blood Pressure Brother     Diabetes Brother     High Blood Pressure Brother        CareTeam (Including outside providers/suppliers regularly involved in providing care):   Patient Care Team:  Alondra Connelly MD as PCP - General (Family Medicine)  Alondra Connelly MD as PCP - [facility] Empaneled Provider  Gene Barth MD as Consulting Physician (Pulmonology)    Wt Readings from Last 3 Encounters:   04/07/21 189 lb 3.2 oz (85.8 kg)   03/10/21 176 lb (79.8 kg)   09/15/20 176 lb 12.8 oz (80.2 kg)     Vitals:    04/07/21 1017   BP: (!) 146/78   Site: Left Upper Arm   Position: Sitting   Pulse: 73   Temp: 96.9 °F (36.1 °C)   TempSrc: Temporal   SpO2: 96%   Weight: 189 lb 3.2 oz (85.8 kg)   Height: 5' 9\" (1.753 m)     Body mass index is 27.94 kg/m². Based upon direct observation of the patient, evaluation of cognition reveals recent and remote memory intact. Patient's complete Health Risk Assessment and screening values have been reviewed and are found in Flowsheets. The following problems were reviewed today and where indicated follow up appointments were made and/or referrals ordered. Positive Risk Factor Screenings with Interventions:            General Health and ACP:  General  In general, how would you say your health is?: Good  In the past 7 days, have you experienced any of the following?  New or Increased Pain, New or Increased Fatigue, Loneliness, Social Isolation, Stress or Anger?: None of These  Do you get the social and emotional support that you need?: Yes  Do you have a Living Will?: Yes  Advance Directives     Power of [address] [place] [address] Will ACP-Advance Directive ACP-Power of     Not on File Not on File Not on File Not on File      General Health Risk Interventions:           Health Habits/Nutrition:  Health Habits/Nutrition  Do you exercise for at least 20 minutes 2-3 times per week?: Yes  Have you lost any weight without trying in the past 3 months?: No  Do you eat only one meal per day?: No  Have you seen the dentist within the past year?: (!) No  Body mass index: (!) 27.94  Health Habits/Nutrition Interventions:  · Dental exam overdue:  patient encouraged to make appointment with his/her dentist    Hearing/Vision:  No exam data present  Hearing/Vision  Do you or your family notice any trouble with your hearing that hasn't been managed with hearing aids?: (!) Yes  Do you have difficulty driving, watching TV, or doing any of your daily activities because of your eyesight?: No  Have you had an eye exam within the past year?: (!) No  Hearing/Vision Interventions:  · will discuss referral to audiologist with patient    Safety:  Safety  Do you have working smoke detectors?: Yes  Have all throw rugs been removed or fastened?: Yes  Do you have non-slip mats or surfaces in all bathtubs/showers?: (!) No  Do all of your stairways have a railing or banister?: Yes  Are your doorways, halls and stairs free of clutter?: (!) No  Do you always fasten your seatbelt when you are in a car?: Yes  Safety Interventions:  · Patient declines any further evaluation/treatment for this issue   Discussed Home safety with patient who will look into bathroom modifications  Personalized Preventive Plan   Current Health Maintenance Status  Immunization History   Administered Date(s) Administered    Influenza Virus Vaccine 10/18/2007, 10/06/2008, 11/16/2012    Influenza, High Dose (Fluzone 65 yrs and older) 09/13/2017, 10/08/2018    Influenza, Quadv, IM, PF (6 mo and older Fluzone, Flulaval, Fluarix, and 3 yrs and older Afluria) 10/06/2015    Influenza, Triv, inactivated, subunit, adjuvanted, IM (Fluad 65 yrs and older) 09/11/2019    Pneumococcal Conjugate 13-valent ([id number]) 04/11/2016    Pneumococcal Polysaccharide ([id number]) 09/15/2017, 10/15/2017    Tdap (Boostrix, Adacel) 04/28/2014    Zoster Live (Zostavax) 09/15/2016        Health Maintenance   Topic Date Due    Hepatitis C screen  Never done    COVID-19 Vaccine (1) Never done    PSA counseling  Never done    Shingles Vaccine (2 of 3) 11/10/2016    Annual Wellness Visit (AWV)  Never done    Flu vaccine (Season Ended) 09/01/2021    Potassium monitoring  03/23/2022    Creatinine monitoring  03/23/2022    DTaP/Tdap/Td vaccine (2 - Td) 04/28/2024    Pneumococcal 65+ years Vaccine  Completed    Hepatitis A vaccine  Aged Out    Hepatitis B vaccine  Aged Out    Hib vaccine  Aged Out    Meningococcal (ACWY) vaccine  Aged Out     Recommendations for [place] Due: see orders and patient instructions/AVS.  . Recommended screening schedule for the next 5-10 years is provided to the patient in written form: see Patient Sathish Andres was seen today for medicare awv and hypertension. Diagnoses and all orders for this visit:    Routine general medical examination at a health care facility    Venous insufficiency (chronic) (peripheral)    Prostate cancer ([facility] [address]    Panlobular emphysema ([facility] [address]    Elevated lipoprotein(a)                   OFFICE NOTE    4/7/21  Name: Ofe Lepe  KBN:1/60/[birth year]10   Sex:male   Age:81 y.o. SUBJECTIVE  Chief Complaint   Patient presents with    Medicare AWV    Hypertension       HPI In addition to AWV came in for routine checkup. Cardiologist agreed to keep him off anticoagulation except for ASA. His lymphedema is considerably better. Can no longer get liquid O2 and has to use tanks or concentrators    Review of Systems   Constitutional: Positive for fatigue. Negative for appetite change, fever and unexpected weight change. HENT: Negative for congestion, ear pain, hearing loss, sinus pain, sore throat and trouble swallowing. Eyes: Negative for photophobia, redness and visual disturbance. Respiratory: Positive for shortness of breath. Negative for cough and wheezing. Cardiovascular: Positive for palpitations. Negative for chest pain and leg swelling. Gastrointestinal: Negative for abdominal pain, blood in stool, constipation, diarrhea and vomiting.    Endocrine: Negative for cold intolerance, polydipsia and Brother      Social History     Tobacco History     Smoking Status  Former Smoker Quit date  3/1/2003 Smoking Frequency  1 pack/day for 30 years (30 pk yrs) Smoking Tobacco Type  Cigarettes    Smokeless Tobacco Use  Never Used          Alcohol History     Alcohol Use Status  Not Currently          Drug Use     Drug Use Status  Never          Sexual Activity     Sexually Active  Not Currently Partners  Female                OBJECTIVE  Vitals:    04/07/21 1017   BP: (!) 146/78   Site: Left Upper Arm   Position: Sitting   Pulse: 73   Temp: 96.9 °F (36.1 °C)   TempSrc: Temporal   SpO2: 96%   Weight: 189 lb 3.2 oz (85.8 kg)   Height: 5' 9\" (1.753 m)        Body mass index is 27.94 kg/m². No orders of the defined types were placed in this encounter. EXAM   Physical Exam  Vitals signs and nursing note reviewed. Constitutional:       Appearance: Normal appearance. He is well-developed and normal weight. HENT:      Right Ear: Tympanic membrane, ear canal and external ear normal.      Left Ear: Tympanic membrane, ear canal and external ear normal.      Nose: Nose normal.      Mouth/Throat:      Pharynx: Oropharynx is clear. No posterior oropharyngeal erythema. Eyes:      General: No scleral icterus. Conjunctiva/sclera: Conjunctivae normal.      Pupils: Pupils are equal, round, and reactive to light. Neck:      Musculoskeletal: Normal range of motion and neck supple. No muscular tenderness. Thyroid: No thyroid mass or thyromegaly. Vascular: No carotid bruit or JVD. Trachea: Trachea normal.   Cardiovascular:      Rate and Rhythm: Normal rate and regular rhythm. Pulses: Normal pulses. Heart sounds: Normal heart sounds. No murmur. No gallop. Pulmonary:      Effort: Pulmonary effort is normal.      Breath sounds: Normal breath sounds. No wheezing, rhonchi or rales. Comments: O2 dependent. Severe emphysema, pursed lip breather.  Has managed with O2 over 10 years  Abdominal: General: Bowel sounds are normal. There is no distension. Palpations: Abdomen is soft. There is no mass. Tenderness: There is no abdominal tenderness. There is no guarding. Musculoskeletal: Normal range of motion. General: No swelling or tenderness. Comments: Legs much better, trace edema moderate stasis changes    Lymphadenopathy:      Cervical: No cervical adenopathy. Skin:     General: Skin is warm and dry. Coloration: Skin is not jaundiced. Findings: No bruising or rash. Neurological:      General: No focal deficit present. Mental Status: He is alert and oriented to person, place, and time. Sensory: No sensory deficit. Motor: No weakness or abnormal muscle tone. Coordination: Coordination normal.      Gait: Gait normal.   Psychiatric:         Mood and Affect: Mood normal.         Behavior: Behavior normal.           Elie Quiros was seen today for medicare awv and hypertension. Diagnoses and all orders for this visit:    Routine general medical examination at a health care facility  Reviewed AWV and smart blocks addressed  Venous insufficiency (chronic) (peripheral)  Seems better on current BP meds which were changed and off anticoagulation. Will continue as is    Prostate cancer [facility])  Sees Urology seems to be doing well, no changes made  Panlobular emphysema ([facility] [address]  Discussed at some length his problems getting liquid O2, may just have to wait until pandemic subsides    Elevated lipoprotein(a)  Have elected to watch. He is 80 without evidence of atheroschlerosis not much to be gained with statin I can see. Return for Medicare Annual Wellness Visit in 1 year.     Electronically signed by Millicent Subramanian MD on 4/7/21 at 11:29 AM EDT

## 2021-04-07 NOTE — PATIENT INSTRUCTIONS
Personalized Preventive Plan for Kj Lew - 4/7/2021  Medicare offers a range of preventive health benefits. Some of the tests and screenings are paid in full while other may be subject to a deductible, co-insurance, and/or copay. Some of these benefits include a comprehensive review of your medical history including lifestyle, illnesses that may run in your family, and various assessments and screenings as appropriate. After reviewing your medical record and screening and assessments performed today your provider may have ordered immunizations, labs, imaging, and/or referrals for you. A list of these orders (if applicable) as well as your Preventive Care list are included within your After Visit Summary for your review. Other Preventive Recommendations:    · A preventive eye exam performed by an eye specialist is recommended every 1-2 years to screen for glaucoma; cataracts, macular degeneration, and other eye disorders. · A preventive dental visit is recommended every 6 months. · Try to get at least 150 minutes of exercise per week or 10,000 steps per day on a pedometer . · Order or download the FREE \"Exercise & Physical Activity: Your Everyday Guide\" from [employer]. Call 1-419.466.9771 or search [employer] online. · You need 8720-1483 mg of calcium and 8063-2557 IU of vitamin D per day. It is possible to meet your calcium requirement with diet alone, but a vitamin D supplement is usually necessary to meet this goal.  · When exposed to the sun, use a sunscreen that protects against both UVA and UVB radiation with an SPF of 30 or greater. Reapply every 2 to 3 hours or after sweating, drying off with a towel, or swimming. · Always wear a seat belt when traveling in a car. Always wear a helmet when riding a bicycle or motorcycle.

## 2021-08-31 DIAGNOSIS — I10 ESSENTIAL HYPERTENSION: ICD-10-CM

## 2021-08-31 RX ORDER — VALSARTAN 160 MG/1
160 TABLET ORAL DAILY
Qty: 30 TABLET | Refills: 2 | Status: SHIPPED
Start: 2021-08-31 | End: 2021-10-07 | Stop reason: SDUPTHER

## 2021-08-31 NOTE — TELEPHONE ENCOUNTER
Name of Medication(s) Requested:  Valsartan    Pharmacy Requested:   [facility]    Medication(s) pended? [x] Yes  [] No    Last Appointment:  4/7/2021    Future appts:  Future Appointments   Date Time Provider Mark Arias   10/7/2021 10:15 AM [facility]   3/15/2022  2:45 PM Erum Garduno MD [facility]          Does patient need call back?   [] Yes  [x] No

## 2021-09-27 RX ORDER — DILTIAZEM HYDROCHLORIDE 180 MG/1
CAPSULE, COATED, EXTENDED RELEASE ORAL
Qty: 90 CAPSULE | Refills: 3 | Status: SHIPPED
Start: 2021-09-27 | End: 2022-09-20 | Stop reason: SDUPTHER

## 2021-10-07 ENCOUNTER — OFFICE VISIT (OUTPATIENT)
Dept: FAMILY MEDICINE CLINIC | Age: 81
End: 2021-10-07
Payer: MEDICARE

## 2021-10-07 VITALS
SYSTOLIC BLOOD PRESSURE: 138 MMHG | DIASTOLIC BLOOD PRESSURE: 72 MMHG | HEART RATE: 83 BPM | WEIGHT: 179.8 LBS | OXYGEN SATURATION: 98 % | BODY MASS INDEX: 25.8 KG/M2 | TEMPERATURE: 96.9 F

## 2021-10-07 DIAGNOSIS — J43.1 PANLOBULAR EMPHYSEMA (HCC): ICD-10-CM

## 2021-10-07 DIAGNOSIS — Z23 IMMUNIZATION DUE: Primary | ICD-10-CM

## 2021-10-07 DIAGNOSIS — I10 ESSENTIAL HYPERTENSION: ICD-10-CM

## 2021-10-07 DIAGNOSIS — C61 PROSTATE CANCER (HCC): ICD-10-CM

## 2021-10-07 DIAGNOSIS — I87.2 VENOUS INSUFFICIENCY (CHRONIC) (PERIPHERAL): ICD-10-CM

## 2021-10-07 PROBLEM — L97.929 VENOUS STASIS ULCER OF LEFT LOWER LEG WITH EDEMA OF LEFT LOWER LEG (HCC): Status: RESOLVED | Noted: 2020-05-01 | Resolved: 2021-10-07

## 2021-10-07 PROBLEM — I83.029 VENOUS STASIS ULCER OF LEFT LOWER LEG WITH EDEMA OF LEFT LOWER LEG (HCC): Status: RESOLVED | Noted: 2020-05-01 | Resolved: 2021-10-07

## 2021-10-07 PROBLEM — R60.0 VENOUS STASIS ULCER OF LEFT LOWER LEG WITH EDEMA OF LEFT LOWER LEG (HCC): Status: RESOLVED | Noted: 2020-05-01 | Resolved: 2021-10-07

## 2021-10-07 PROBLEM — R60.9 VENOUS STASIS ULCER OF LEFT LOWER LEG WITH EDEMA OF LEFT LOWER LEG (HCC): Status: RESOLVED | Noted: 2020-05-01 | Resolved: 2021-10-07

## 2021-10-07 PROBLEM — I83.892 VENOUS STASIS ULCER OF LEFT LOWER LEG WITH EDEMA OF LEFT LOWER LEG (HCC): Status: RESOLVED | Noted: 2020-05-01 | Resolved: 2021-10-07

## 2021-10-07 PROCEDURE — 99214 OFFICE O/P EST MOD 30 MIN: CPT | Performed by: FAMILY MEDICINE

## 2021-10-07 PROCEDURE — G0008 ADMIN INFLUENZA VIRUS VAC: HCPCS | Performed by: FAMILY MEDICINE

## 2021-10-07 PROCEDURE — 93000 ELECTROCARDIOGRAM COMPLETE: CPT | Performed by: FAMILY MEDICINE

## 2021-10-07 PROCEDURE — 90694 VACC AIIV4 NO PRSRV 0.5ML IM: CPT | Performed by: FAMILY MEDICINE

## 2021-10-07 RX ORDER — VALSARTAN 160 MG/1
160 TABLET ORAL DAILY
Qty: 90 TABLET | Refills: 3 | Status: SHIPPED
Start: 2021-10-07 | End: 2022-09-20 | Stop reason: SDUPTHER

## 2021-10-07 ASSESSMENT — ENCOUNTER SYMPTOMS
EYE REDNESS: 0
BACK PAIN: 0
BLOOD IN STOOL: 0
SINUS PAIN: 0
DIARRHEA: 0
COUGH: 1
VOMITING: 0
WHEEZING: 1
ABDOMINAL PAIN: 0
TROUBLE SWALLOWING: 0
PHOTOPHOBIA: 0
SHORTNESS OF BREATH: 1
SORE THROAT: 0
CONSTIPATION: 0

## 2021-10-07 NOTE — PROGRESS NOTES
OFFICE NOTE    10/7/21  Name: Neha Lynne  MJZ:[birth year]   Sex:male   Age:81 y.o. SUBJECTIVE  Chief Complaint   Patient presents with    Hypertension       HPI comes in for checkup. Suffers from COPD. Reports inhalers are hundreds of dollars a mos and concentrator is ok when at home but any acitivity has to take a tank as pulse delivery just doesn't cut it for him    Review of Systems   Constitutional: Positive for fatigue. Negative for appetite change, fever and unexpected weight change. HENT: Negative for congestion, ear pain, hearing loss, sinus pain, sore throat and trouble swallowing. Eyes: Negative for photophobia, redness and visual disturbance. Respiratory: Positive for cough, shortness of breath and wheezing. Cardiovascular: Positive for palpitations. Negative for chest pain and leg swelling. Gastrointestinal: Negative for abdominal pain, blood in stool, constipation, diarrhea and vomiting. Endocrine: Negative for cold intolerance, polydipsia and polyuria. Genitourinary: Negative for difficulty urinating, genital sores, hematuria and urgency. Musculoskeletal: Negative for arthralgias, back pain and joint swelling. Skin: Negative for pallor and rash. Allergic/Immunologic: Negative for food allergies. Neurological: Negative for dizziness, tremors, seizures, syncope, numbness and headaches. Hematological: Negative for adenopathy. Does not bruise/bleed easily. Psychiatric/Behavioral: Negative for agitation, dysphoric mood, hallucinations and sleep disturbance. The patient is nervous/anxious. All other systems reviewed and are negative.            Current Outpatient Medications:     valsartan (DIOVAN) 160 MG tablet, Take 1 tablet by mouth daily, Disp: 90 tablet, Rfl: 3    dilTIAZem (CARDIZEM CD) 180 MG extended release capsule, TAKE 1 CAPSULE DAILY, Disp: 90 capsule, Rfl: 3    mometasone-formoterol (DULERA) 200-5 MCG/ACT inhaler, Inhale 2 puffs into the lungs every 12 hours, Disp: 3 Inhaler, Rfl: 3    aclidinium (TUDORZA PRESSAIR) 400 MCG/ACT AEPB inhaler, Inhale 1 puff into the lungs 2 times daily, Disp: 3 each, Rfl: 3    OXYGEN, Inhale 2.5 L into the lungs, Disp: , Rfl:     albuterol sulfate HFA (PROAIR HFA) 108 (90 Base) MCG/ACT inhaler, Inhale 2 puffs into the lungs every 6 hours as needed for Wheezing, Disp: 3 Inhaler, Rfl: 2    aspirin 325 MG tablet, Take 325 mg by mouth daily, Disp: , Rfl:   Allergies   Allergen Reactions    Apixaban     Rivaroxaban        Past Medical History:   Diagnosis Date    COPD (chronic obstructive pulmonary disease) (HCC)     Ischemic colitis ([facility] [address] 2008    Prostate cancer ([facility] [address]     x2 years      Past Surgical History:   Procedure Laterality Date    COLONOSCOPY  2009    Dr. Shashi Wright      Family History   Problem Relation Age of Onset    High Blood Pressure Mother     Heart Attack Mother     Other Mother         blood clots     Emphysema Father     Diabetes Sister     Diabetes Brother     High Blood Pressure Brother     Diabetes Brother     High Blood Pressure Brother      Social History     Tobacco History     Smoking Status  Former Smoker Quit date  3/1/2003 Smoking Frequency  1 pack/day for 30 years (30 pk yrs) Smoking Tobacco Type  Cigarettes    Smokeless Tobacco Use  Never Used          Alcohol History     Alcohol Use Status  Not Currently          Drug Use     Drug Use Status  Never          Sexual Activity     Sexually Active  Not Currently Partners  Female                OBJECTIVE  Vitals:    10/07/21 1046   BP: 138/72   Site: Left Upper Arm   Position: Sitting   Pulse: 83   Temp: 96.9 °F (36.1 °C)   TempSrc: Temporal   SpO2: 98%   Weight: 179 lb 12.8 oz (81.6 kg)        Body mass index is 25.8 kg/m².     Orders Placed This Encounter   Procedures    INFLUENZA, QUADV, ADJUVANTED, 72 YRS =, IM, PF, PREFILL SYR, 0.5ML (FLUAD)    EKG 12 Lead     Standing Status:   Future     Number of Occurrences:   1     Standing Expiration Date:   10/7/2022     Order Specific Question:   Reason for Exam?     Answer:   Hypertension        EXAM   Physical Exam  Vitals and nursing note reviewed. Constitutional:       Appearance: Normal appearance. He is well-developed and normal weight. HENT:      Right Ear: Tympanic membrane, ear canal and external ear normal.      Left Ear: Tympanic membrane, ear canal and external ear normal.      Nose: Nose normal. No congestion or rhinorrhea. Mouth/Throat:      Pharynx: No posterior oropharyngeal erythema. Eyes:      General: No scleral icterus. Conjunctiva/sclera: Conjunctivae normal.      Pupils: Pupils are equal, round, and reactive to light. Neck:      Thyroid: No thyroid mass or thyromegaly. Vascular: No carotid bruit or JVD. Trachea: Trachea normal.   Cardiovascular:      Rate and Rhythm: Normal rate and regular rhythm. Pulses: Normal pulses. Heart sounds: Normal heart sounds. No murmur heard. No gallop. Pulmonary:      Effort: Pulmonary effort is normal.      Breath sounds: Normal breath sounds. No wheezing or rales. Comments: Severe obstruction purse lip breathing  Abdominal:      General: Bowel sounds are normal. There is no distension. Palpations: Abdomen is soft. There is no mass. Tenderness: There is no abdominal tenderness. There is no guarding. Hernia: No hernia is present. Musculoskeletal:         General: No swelling or tenderness. Normal range of motion. Cervical back: Neck supple. Comments: Moderate stasis changes, edema resolved   Lymphadenopathy:      Cervical: No cervical adenopathy. Skin:     General: Skin is warm and dry. Coloration: Skin is not jaundiced. Findings: No bruising or rash. Neurological:      General: No focal deficit present. Mental Status: He is alert and oriented to person, place, and time. Motor: No abnormal muscle tone.    Psychiatric:         Mood and Affect: Mood normal. Behavior: Behavior normal.           Celeste Masrhall was seen today for hypertension. Diagnoses and all orders for this visit:    Immunization due  -     INFLUENZA, QUADV, ADJUVANTED, 65 YRS =, IM, PF, PREFILL SYR, 0.5ML (FLUAD)    Essential hypertension  -     valsartan (DIOVAN) 160 MG tablet; Take 1 tablet by mouth daily  -     EKG 12 Lead; Future  -     EKG 12 Lead  Well controlled. Dependent edema and venous insufficiiency ulcer resolved  Panlobular emphysema (HCC)  On O2 over 10 years, seems very intelligent and cooperative with medical advice   Prostate cancer ([facility] [address]  No indication of recurrance, doing well. Venous insufficiency (chronic) (peripheral)  Getting him off Ca++ channel blocker seems to have helped a lot. No follow-ups on file.     Electronically signed by Brigitte Nuno MD on 10/7/21 at 11:40 AM EDT

## 2022-02-23 ENCOUNTER — TELEPHONE (OUTPATIENT)
Dept: FAMILY MEDICINE CLINIC | Age: 82
End: 2022-02-23

## 2022-02-23 DIAGNOSIS — I10 PRIMARY HYPERTENSION: Primary | ICD-10-CM

## 2022-02-23 DIAGNOSIS — E78.49 OTHER HYPERLIPIDEMIA: ICD-10-CM

## 2022-02-23 NOTE — TELEPHONE ENCOUNTER
Karla Bailey called in to schedule appt for next month. Will be due for bloodwork.  Asking for you to place orders except for PSA

## 2022-03-09 ENCOUNTER — TELEPHONE (OUTPATIENT)
Dept: FAMILY MEDICINE CLINIC | Age: 82
End: 2022-03-09

## 2022-03-09 DIAGNOSIS — Z12.5 SCREENING FOR PROSTATE CANCER: Primary | ICD-10-CM

## 2022-03-09 NOTE — TELEPHONE ENCOUNTER
Order cannot be placed together, placed separately.  He will need to advise lab there are 2 separate BW orders from me and both need to be completed

## 2022-03-09 NOTE — TELEPHONE ENCOUNTER
----- Message from Alise Landry sent at 3/9/2022 10:14 AM EST -----  Subject: Message to Provider    QUESTIONS  Information for Provider? Pt is calling stating that Dr. Amber Peña would like   pt to have his PSA done at his PCP office due to the office would be   remodeling. Pt would like this order added to labs today so that he can   come in and have all labs drawn at same time. pt will be coming in for   labs tomorrow 3/10/22 or 3/11/2022. Pt would like a call back to verify   his PSA has been added please.  ---------------------------------------------------------------------------  --------------  CALL BACK INFO  What is the best way for the office to contact you? OK to leave message on   voicemail  Preferred Call Back Phone Number? [phone]  ---------------------------------------------------------------------------  --------------  SCRIPT ANSWERS  Relationship to Patient?  Self

## 2022-03-11 DIAGNOSIS — E78.49 OTHER HYPERLIPIDEMIA: ICD-10-CM

## 2022-03-11 DIAGNOSIS — I10 PRIMARY HYPERTENSION: ICD-10-CM

## 2022-03-11 DIAGNOSIS — Z12.5 SCREENING FOR PROSTATE CANCER: ICD-10-CM

## 2022-03-11 LAB
ALBUMIN SERPL-MCNC: 4.2 G/DL (ref 3.5–5.2)
ALP BLD-CCNC: 87 U/L (ref 40–129)
ALT SERPL-CCNC: 14 U/L (ref 0–40)
ANION GAP SERPL CALCULATED.3IONS-SCNC: 16 MMOL/L (ref 7–16)
AST SERPL-CCNC: 19 U/L (ref 0–39)
BASOPHILS ABSOLUTE: 0.04 E9/L (ref 0–0.2)
BASOPHILS RELATIVE PERCENT: 0.4 % (ref 0–2)
BILIRUB SERPL-MCNC: 0.5 MG/DL (ref 0–1.2)
BILIRUBIN URINE: NEGATIVE
BLOOD, URINE: NEGATIVE
BUN BLDV-MCNC: 20 MG/DL (ref 6–23)
CALCIUM SERPL-MCNC: 9.1 MG/DL (ref 8.6–10.2)
CHLORIDE BLD-SCNC: 99 MMOL/L (ref 98–107)
CHOLESTEROL, TOTAL: 258 MG/DL (ref 0–199)
CLARITY: CLEAR
CO2: 26 MMOL/L (ref 22–29)
COLOR: YELLOW
CREAT SERPL-MCNC: 0.8 MG/DL (ref 0.7–1.2)
EOSINOPHILS ABSOLUTE: 0.13 E9/L (ref 0.05–0.5)
EOSINOPHILS RELATIVE PERCENT: 1.4 % (ref 0–6)
GFR AFRICAN AMERICAN: >60
GFR NON-AFRICAN AMERICAN: >60 ML/MIN/1.73
GLUCOSE BLD-MCNC: 104 MG/DL (ref 74–99)
GLUCOSE URINE: NEGATIVE MG/DL
HCT VFR BLD CALC: 39.9 % (ref 37–54)
HDLC SERPL-MCNC: 71 MG/DL
HEMOGLOBIN: 12.1 G/DL (ref 12.5–16.5)
IMMATURE GRANULOCYTES #: 0.02 E9/L
IMMATURE GRANULOCYTES %: 0.2 % (ref 0–5)
KETONES, URINE: NEGATIVE MG/DL
LDL CHOLESTEROL CALCULATED: 168 MG/DL (ref 0–99)
LEUKOCYTE ESTERASE, URINE: NEGATIVE
LYMPHOCYTES ABSOLUTE: 3.22 E9/L (ref 1.5–4)
LYMPHOCYTES RELATIVE PERCENT: 35.3 % (ref 20–42)
MCH RBC QN AUTO: 29.7 PG (ref 26–35)
MCHC RBC AUTO-ENTMCNC: 30.3 % (ref 32–34.5)
MCV RBC AUTO: 98 FL (ref 80–99.9)
MONOCYTES ABSOLUTE: 0.75 E9/L (ref 0.1–0.95)
MONOCYTES RELATIVE PERCENT: 8.2 % (ref 2–12)
NEUTROPHILS ABSOLUTE: 4.95 E9/L (ref 1.8–7.3)
NEUTROPHILS RELATIVE PERCENT: 54.5 % (ref 43–80)
NITRITE, URINE: NEGATIVE
PDW BLD-RTO: 13.7 FL (ref 11.5–15)
PH UA: 6 (ref 5–9)
PLATELET # BLD: 311 E9/L (ref 130–450)
PMV BLD AUTO: 10.6 FL (ref 7–12)
POTASSIUM SERPL-SCNC: 4.7 MMOL/L (ref 3.5–5)
PROSTATE SPECIFIC ANTIGEN: 1.05 NG/ML (ref 0–4)
PROTEIN UA: NEGATIVE MG/DL
RBC # BLD: 4.07 E12/L (ref 3.8–5.8)
SODIUM BLD-SCNC: 141 MMOL/L (ref 132–146)
SPECIFIC GRAVITY UA: 1.02 (ref 1–1.03)
TOTAL PROTEIN: 6.8 G/DL (ref 6.4–8.3)
TRIGL SERPL-MCNC: 97 MG/DL (ref 0–149)
UROBILINOGEN, URINE: 0.2 E.U./DL
VLDLC SERPL CALC-MCNC: 19 MG/DL
WBC # BLD: 9.1 E9/L (ref 4.5–11.5)

## 2022-03-11 PROCEDURE — 81003 URINALYSIS AUTO W/O SCOPE: CPT | Performed by: FAMILY MEDICINE

## 2022-03-12 LAB — TSH SERPL DL<=0.05 MIU/L-ACNC: 2.74 UIU/ML (ref 0.27–4.2)

## 2022-03-18 ENCOUNTER — OFFICE VISIT (OUTPATIENT)
Dept: FAMILY MEDICINE CLINIC | Age: 82
End: 2022-03-18
Payer: MEDICARE

## 2022-03-18 VITALS
HEART RATE: 73 BPM | WEIGHT: 182.8 LBS | BODY MASS INDEX: 26.61 KG/M2 | DIASTOLIC BLOOD PRESSURE: 72 MMHG | OXYGEN SATURATION: 96 % | TEMPERATURE: 96.8 F | SYSTOLIC BLOOD PRESSURE: 126 MMHG

## 2022-03-18 DIAGNOSIS — J43.1 PANLOBULAR EMPHYSEMA (HCC): Primary | ICD-10-CM

## 2022-03-18 DIAGNOSIS — I87.2 VENOUS INSUFFICIENCY (CHRONIC) (PERIPHERAL): ICD-10-CM

## 2022-03-18 DIAGNOSIS — E78.41 ELEVATED LIPOPROTEIN(A): ICD-10-CM

## 2022-03-18 DIAGNOSIS — C61 PROSTATE CANCER (HCC): ICD-10-CM

## 2022-03-18 PROCEDURE — 99214 OFFICE O/P EST MOD 30 MIN: CPT | Performed by: FAMILY MEDICINE

## 2022-03-18 ASSESSMENT — ENCOUNTER SYMPTOMS
BLOOD IN STOOL: 0
PHOTOPHOBIA: 0
SORE THROAT: 0
EYE REDNESS: 0
DIARRHEA: 0
COUGH: 0
VOMITING: 0
TROUBLE SWALLOWING: 0
SHORTNESS OF BREATH: 1
SINUS PAIN: 0
WHEEZING: 0
BACK PAIN: 0
CONSTIPATION: 0
ABDOMINAL PAIN: 0

## 2022-03-18 NOTE — PROGRESS NOTES
OFFICE NOTE    3/18/22  Name: Julianna Castro  [facility]:   Sex:male   Age:82 y.o. SUBJECTIVE  Chief Complaint   Patient presents with    COPD       HPI comes in after celebrating his 82th birthday yesterday. Dr. Gem Flores feels he may have improved his lung function some overall which is very unusual. Blew out all his BD candles  Review of Systems   Constitutional: Positive for fatigue. Negative for appetite change, fever and unexpected weight change. HENT: Positive for hearing loss. Negative for congestion, ear pain, sinus pain, sore throat and trouble swallowing. Eyes: Negative for photophobia, redness and visual disturbance. Respiratory: Positive for shortness of breath. Negative for cough and wheezing. Cardiovascular: Negative for chest pain, palpitations and leg swelling. Gastrointestinal: Negative for abdominal pain, blood in stool, constipation, diarrhea and vomiting. Endocrine: Negative for cold intolerance, polydipsia and polyuria. Genitourinary: Positive for urgency. Negative for difficulty urinating, genital sores and hematuria. Musculoskeletal: Positive for arthralgias. Negative for back pain and joint swelling. Allergic/Immunologic: Negative for food allergies. Neurological: Negative for dizziness, tremors, seizures, syncope, numbness and headaches. Hematological: Negative for adenopathy. Does not bruise/bleed easily. Psychiatric/Behavioral: Negative for agitation, dysphoric mood, hallucinations and sleep disturbance. The patient is not nervous/anxious. All other systems reviewed and are negative.            Current Outpatient Medications:     valsartan (DIOVAN) 160 MG tablet, Take 1 tablet by mouth daily, Disp: 90 tablet, Rfl: 3    dilTIAZem (CARDIZEM CD) 180 MG extended release capsule, TAKE 1 CAPSULE DAILY, Disp: 90 capsule, Rfl: 3    mometasone-formoterol (DULERA) 200-5 MCG/ACT inhaler, Inhale 2 puffs into the lungs every 12 hours, Disp: 3 Inhaler, Rfl: 3   membrane, ear canal and external ear normal.      Left Ear: Tympanic membrane, ear canal and external ear normal.      Nose: Nose normal. No congestion or rhinorrhea. Mouth/Throat:      Pharynx: Oropharynx is clear. No posterior oropharyngeal erythema. Eyes:      General: No scleral icterus. Conjunctiva/sclera: Conjunctivae normal.      Pupils: Pupils are equal, round, and reactive to light. Neck:      Thyroid: No thyroid mass or thyromegaly. Vascular: No carotid bruit or JVD. Trachea: Trachea normal.   Cardiovascular:      Rate and Rhythm: Normal rate and regular rhythm. Heart sounds: Normal heart sounds. No murmur heard. No gallop. Pulmonary:      Effort: Pulmonary effort is normal.      Breath sounds: Normal breath sounds. No wheezing, rhonchi or rales. Comments: Moderately severe obstruction. Pursed lip breather. Uses O2  Abdominal:      General: Bowel sounds are normal. There is no distension. Palpations: Abdomen is soft. There is no mass. Tenderness: There is no abdominal tenderness. There is no guarding. Hernia: No hernia is present. Musculoskeletal:         General: No swelling or tenderness. Normal range of motion. Cervical back: Neck supple. No tenderness. Right lower leg: No edema. Left lower leg: No edema. Lymphadenopathy:      Cervical: No cervical adenopathy. Skin:     General: Skin is warm and dry. Coloration: Skin is not jaundiced. Findings: No bruising or rash. Neurological:      General: No focal deficit present. Mental Status: He is alert and oriented to person, place, and time. Sensory: No sensory deficit. Motor: No weakness or abnormal muscle tone. Coordination: Coordination normal.      Gait: Gait normal.   Psychiatric:         Mood and Affect: Mood normal.         Behavior: Behavior normal.           Vashti Navarro was seen today for copd.     Diagnoses and all orders for this visit:    [facility] emphysema ([address]  Labs current. Follows with Dr. Lela Licona whom he likes  Prostate cancer [facility])  Seems to be in remission, no changes made  Elevated lipoprotein(a)  Diet controlled, no changes made  Venous insufficiency (chronic) (peripheral)  Wears compression stockings most of time. Reports he has daily exercise routine has has stuck with        No follow-ups on file.     Electronically signed by Destiney Cooper MD on 3/18/22 at 10:49 AM EDT

## 2022-09-20 ENCOUNTER — OFFICE VISIT (OUTPATIENT)
Dept: FAMILY MEDICINE CLINIC | Age: 82
End: 2022-09-20
Payer: MEDICARE

## 2022-09-20 VITALS
TEMPERATURE: 97.6 F | DIASTOLIC BLOOD PRESSURE: 82 MMHG | RESPIRATION RATE: 17 BRPM | OXYGEN SATURATION: 93 % | HEIGHT: 69 IN | WEIGHT: 178 LBS | HEART RATE: 66 BPM | SYSTOLIC BLOOD PRESSURE: 120 MMHG | BODY MASS INDEX: 26.36 KG/M2

## 2022-09-20 DIAGNOSIS — H61.23 BILATERAL IMPACTED CERUMEN: ICD-10-CM

## 2022-09-20 DIAGNOSIS — E78.41 ELEVATED LIPOPROTEIN(A): ICD-10-CM

## 2022-09-20 DIAGNOSIS — I10 ESSENTIAL HYPERTENSION: ICD-10-CM

## 2022-09-20 DIAGNOSIS — C61 PROSTATE CANCER (HCC): ICD-10-CM

## 2022-09-20 DIAGNOSIS — J43.1 PANLOBULAR EMPHYSEMA (HCC): Primary | ICD-10-CM

## 2022-09-20 DIAGNOSIS — I87.2 VENOUS INSUFFICIENCY (CHRONIC) (PERIPHERAL): ICD-10-CM

## 2022-09-20 PROCEDURE — 3288F FALL RISK ASSESSMENT DOCD: CPT | Performed by: FAMILY MEDICINE

## 2022-09-20 PROCEDURE — 99214 OFFICE O/P EST MOD 30 MIN: CPT | Performed by: FAMILY MEDICINE

## 2022-09-20 PROCEDURE — 1123F ACP DISCUSS/DSCN MKR DOCD: CPT | Performed by: FAMILY MEDICINE

## 2022-09-20 RX ORDER — LORATADINE 10 MG/1
10 TABLET ORAL DAILY
Qty: 30 TABLET | Refills: 5 | Status: SHIPPED | OUTPATIENT
Start: 2022-09-20

## 2022-09-20 RX ORDER — DILTIAZEM HYDROCHLORIDE 180 MG/1
CAPSULE, COATED, EXTENDED RELEASE ORAL
Qty: 90 CAPSULE | Refills: 3 | Status: SHIPPED | OUTPATIENT
Start: 2022-09-20

## 2022-09-20 RX ORDER — VALSARTAN 160 MG/1
160 TABLET ORAL DAILY
Qty: 90 TABLET | Refills: 3 | Status: SHIPPED | OUTPATIENT
Start: 2022-09-20

## 2022-09-20 SDOH — ECONOMIC STABILITY: FOOD INSECURITY: WITHIN THE PAST 12 MONTHS, YOU WORRIED THAT YOUR FOOD WOULD RUN OUT BEFORE YOU GOT MONEY TO BUY MORE.: NEVER TRUE

## 2022-09-20 SDOH — ECONOMIC STABILITY: FOOD INSECURITY: WITHIN THE PAST 12 MONTHS, THE FOOD YOU BOUGHT JUST DIDN'T LAST AND YOU DIDN'T HAVE MONEY TO GET MORE.: NEVER TRUE

## 2022-09-20 ASSESSMENT — PATIENT HEALTH QUESTIONNAIRE - PHQ9
SUM OF ALL RESPONSES TO PHQ QUESTIONS 1-9: 0
1. LITTLE INTEREST OR PLEASURE IN DOING THINGS: 0
SUM OF ALL RESPONSES TO PHQ QUESTIONS 1-9: 0
2. FEELING DOWN, DEPRESSED OR HOPELESS: 0
SUM OF ALL RESPONSES TO PHQ9 QUESTIONS 1 & 2: 0

## 2022-09-20 ASSESSMENT — LIFESTYLE VARIABLES
HOW MANY STANDARD DRINKS CONTAINING ALCOHOL DO YOU HAVE ON A TYPICAL DAY: PATIENT DOES NOT DRINK
HOW OFTEN DO YOU HAVE A DRINK CONTAINING ALCOHOL: NEVER

## 2022-09-20 ASSESSMENT — ENCOUNTER SYMPTOMS
BLOOD IN STOOL: 0
EYE REDNESS: 0
VOMITING: 0
PHOTOPHOBIA: 0
BACK PAIN: 0
SORE THROAT: 0
SINUS PAIN: 0
TROUBLE SWALLOWING: 0
DIARRHEA: 0
COUGH: 0
CONSTIPATION: 0
ABDOMINAL PAIN: 0
WHEEZING: 1
SHORTNESS OF BREATH: 1

## 2022-09-20 ASSESSMENT — SOCIAL DETERMINANTS OF HEALTH (SDOH): HOW HARD IS IT FOR YOU TO PAY FOR THE VERY BASICS LIKE FOOD, HOUSING, MEDICAL CARE, AND HEATING?: NOT HARD AT ALL

## 2022-09-20 NOTE — PROGRESS NOTES
OFFICE NOTE    9/20/22  Name: Jeni Jimenez  NME:[id number]   Sex:male   Age:82 y.o. SUBJECTIVE  Chief Complaint   Patient presents with    Ear Fullness     Bilateral       Discuss Medications     Cramping in the calves        HPI cramps mostly at night in bed. No claudication reported. Sees Dr. Nena Bloom. Slightly worse, daughter  Christopher Burroughs wonders if could be allergies    Review of Systems   Constitutional:  Positive for fatigue. Negative for appetite change, fever and unexpected weight change. HENT:  Positive for congestion and postnasal drip. Negative for ear pain, hearing loss, sinus pain, sore throat and trouble swallowing. Eyes:  Negative for photophobia, redness and visual disturbance. Respiratory:  Positive for shortness of breath and wheezing. Negative for cough. Cardiovascular:  Positive for leg swelling. Negative for chest pain and palpitations. Gastrointestinal:  Negative for abdominal pain, blood in stool, constipation, diarrhea and vomiting. Endocrine: Negative for cold intolerance, polydipsia and polyuria. Genitourinary:  Positive for frequency and urgency. Negative for difficulty urinating, genital sores and hematuria. Musculoskeletal:  Positive for arthralgias. Negative for back pain and joint swelling. Skin:  Negative for pallor and rash. Allergic/Immunologic: Negative for food allergies. Neurological:  Negative for dizziness, tremors, seizures, syncope, numbness and headaches. Hematological:  Negative for adenopathy. Does not bruise/bleed easily. Psychiatric/Behavioral:  Negative for agitation, dysphoric mood, hallucinations and sleep disturbance. The patient is nervous/anxious. All other systems reviewed and are negative.          Current Outpatient Medications:     valsartan (DIOVAN) 160 MG tablet, Take 1 tablet by mouth daily, Disp: 90 tablet, Rfl: 3    dilTIAZem (CARDIZEM CD) 180 MG extended release capsule, TAKE 1 CAPSULE DAILY, Disp: 90 capsule, Rfl: 3 mometasone-formoterol (DULERA) 200-5 MCG/ACT inhaler, Inhale 2 puffs into the lungs in the morning and 2 puffs in the evening., Disp: 3 each, Rfl: 3    aclidinium (TUDORZA PRESSAIR) 400 MCG/ACT AEPB inhaler, Inhale 1 puff into the lungs in the morning and 1 puff in the evening., Disp: 3 each, Rfl: 3    OXYGEN, Inhale 2.5 L into the lungs, Disp: , Rfl:     albuterol sulfate HFA (PROAIR HFA) 108 (90 Base) MCG/ACT inhaler, Inhale 2 puffs into the lungs every 6 hours as needed for Wheezing, Disp: 3 Inhaler, Rfl: 2    aspirin 325 MG tablet, Take 325 mg by mouth daily, Disp: , Rfl:   Allergies   Allergen Reactions    Apixaban     Rivaroxaban        Past Medical History:   Diagnosis Date    COPD (chronic obstructive pulmonary disease) (HCC)     Ischemic colitis ([facility] 2008    Prostate cancer ([facility] [facility]     x2 years      Past Surgical History:   Procedure Laterality Date    COLONOSCOPY  2009    Dr. Merlin Le      Family History   Problem Relation Age of Onset    High Blood Pressure Mother     Heart Attack Mother     Other Mother         blood clots     Emphysema Father     Diabetes Sister     Diabetes Brother     High Blood Pressure Brother     Diabetes Brother     High Blood Pressure Brother      Social History       Tobacco History       Smoking Status  Former Quit Date  3/1/2003 Smoking Frequency  1 pack/day for 30.00 years (30.00 pk-yrs) Smoking Tobacco Type  Cigarettes quit in 3/1/2003      Smokeless Tobacco Use  Never              Alcohol History       Alcohol Use Status  Not Currently              Drug Use       Drug Use Status  Never              Sexual Activity       Sexually Active  Not Currently Partners  Female                    OBJECTIVE  Vitals:    09/20/22 1023   BP: 120/82   Pulse: 66   Resp: 17   Temp: 97.6 °F (36.4 °C)   TempSrc: Temporal   SpO2: 93%   Weight: 178 lb (80.7 kg)   Height: 5' 9\" (1.753 m)        Body mass index is 26.29 kg/m².     Orders Placed This Encounter   Procedures    Ear wax removal 1 tablet by mouth daily  -     dilTIAZem (CARDIZEM CD) 180 MG extended release capsule; TAKE 1 CAPSULE DAILY  Well controlled no changes made  Venous insufficiency (chronic) (peripheral)  Usually wears compression socks. Uses emolliant couple of times a week. Prostate cancer ([address]  No complaints. Follows with Urology  Elevated lipoprotein(a)  Labs current, no changes made  Bilateral impacted cerumen  -     Ear wax removal  Debora performed successful bilateral lavage  Loratadine 10 mg per day while allergy symptoms are around    No follow-ups on file.     Electronically signed by Abelino Noguera MD on 9/20/22 at 11:09 AM EDT

## 2022-12-28 RX ORDER — FUROSEMIDE 20 MG/1
20 TABLET ORAL DAILY
Qty: 60 TABLET | Refills: 2 | Status: SHIPPED | OUTPATIENT
Start: 2022-12-28

## 2023-03-14 ENCOUNTER — TELEPHONE (OUTPATIENT)
Dept: FAMILY MEDICINE CLINIC | Age: 83
End: 2023-03-14

## 2023-03-14 DIAGNOSIS — C61 PROSTATE CANCER (HCC): ICD-10-CM

## 2023-03-14 DIAGNOSIS — I10 PRIMARY HYPERTENSION: Primary | ICD-10-CM

## 2023-03-14 DIAGNOSIS — E78.49 OTHER HYPERLIPIDEMIA: ICD-10-CM

## 2023-03-14 NOTE — TELEPHONE ENCOUNTER
Patient has an appointment with Dr. Kumar Mclaughlin on 03/21/2023. Patient is requesting blood work to be order, so he could get this done before his appointment. Please advise. Patient was also wondering if you could order a PSA test. Patient would like this sent to Dr. Nicolas Hope office. Please advise.

## 2023-03-14 NOTE — TELEPHONE ENCOUNTER
Tests ordered in EMR. This is a bit complex with epic. Needs to make sure he has them done. He may want to get on my chart and download results for Dr. Sands.  Or we can copy them for him when we see him

## 2023-03-17 DIAGNOSIS — C61 PROSTATE CANCER (HCC): ICD-10-CM

## 2023-03-17 DIAGNOSIS — E78.49 OTHER HYPERLIPIDEMIA: ICD-10-CM

## 2023-03-17 DIAGNOSIS — I10 PRIMARY HYPERTENSION: ICD-10-CM

## 2023-03-17 LAB
ALBUMIN SERPL-MCNC: 3.8 G/DL (ref 3.5–5.2)
ALP SERPL-CCNC: 85 U/L (ref 40–129)
ALT SERPL-CCNC: 11 U/L (ref 0–40)
ANION GAP SERPL CALCULATED.3IONS-SCNC: 17 MMOL/L (ref 7–16)
AST SERPL-CCNC: 20 U/L (ref 0–39)
BASOPHILS # BLD: 0.05 E9/L (ref 0–0.2)
BASOPHILS NFR BLD: 0.5 % (ref 0–2)
BILIRUB SERPL-MCNC: 0.6 MG/DL (ref 0–1.2)
BILIRUB UR QL STRIP: NEGATIVE
BUN SERPL-MCNC: 17 MG/DL (ref 6–23)
CALCIUM SERPL-MCNC: 9.4 MG/DL (ref 8.6–10.2)
CHLORIDE SERPL-SCNC: 104 MMOL/L (ref 98–107)
CHOLESTEROL, TOTAL: 241 MG/DL (ref 0–199)
CLARITY UR: CLEAR
CO2 SERPL-SCNC: 27 MMOL/L (ref 22–29)
COLOR UR: YELLOW
CREAT SERPL-MCNC: 0.6 MG/DL (ref 0.7–1.2)
EOSINOPHIL # BLD: 0.1 E9/L (ref 0.05–0.5)
EOSINOPHIL NFR BLD: 1.1 % (ref 0–6)
ERYTHROCYTE [DISTWIDTH] IN BLOOD BY AUTOMATED COUNT: 13.8 FL (ref 11.5–15)
GLUCOSE SERPL-MCNC: 97 MG/DL (ref 74–99)
GLUCOSE UR STRIP-MCNC: NEGATIVE MG/DL
HCT VFR BLD AUTO: 40.6 % (ref 37–54)
HDLC SERPL-MCNC: 72 MG/DL
HGB BLD-MCNC: 12.7 G/DL (ref 12.5–16.5)
HGB UR QL STRIP: NEGATIVE
IMM GRANULOCYTES # BLD: 0.04 E9/L
IMM GRANULOCYTES NFR BLD: 0.4 % (ref 0–5)
KETONES UR STRIP-MCNC: NEGATIVE MG/DL
LDLC SERPL CALC-MCNC: 147 MG/DL (ref 0–99)
LEUKOCYTE ESTERASE UR QL STRIP: NEGATIVE
LYMPHOCYTES # BLD: 2.91 E9/L (ref 1.5–4)
LYMPHOCYTES NFR BLD: 31.6 % (ref 20–42)
MAGNESIUM SERPL-MCNC: 2 MG/DL (ref 1.6–2.6)
MCH RBC QN AUTO: 30.3 PG (ref 26–35)
MCHC RBC AUTO-ENTMCNC: 31.3 % (ref 32–34.5)
MCV RBC AUTO: 96.9 FL (ref 80–99.9)
MONOCYTES # BLD: 0.96 E9/L (ref 0.1–0.95)
MONOCYTES NFR BLD: 10.4 % (ref 2–12)
NEUTROPHILS # BLD: 5.14 E9/L (ref 1.8–7.3)
NEUTS SEG NFR BLD: 56 % (ref 43–80)
NITRITE UR QL STRIP: NEGATIVE
PH UR STRIP: 6 [PH] (ref 5–9)
PLATELET # BLD AUTO: 363 E9/L (ref 130–450)
PMV BLD AUTO: 10 FL (ref 7–12)
POTASSIUM SERPL-SCNC: 4.9 MMOL/L (ref 3.5–5)
PROT SERPL-MCNC: 6.8 G/DL (ref 6.4–8.3)
PROT UR STRIP-MCNC: NEGATIVE MG/DL
RBC # BLD AUTO: 4.19 E12/L (ref 3.8–5.8)
SODIUM SERPL-SCNC: 148 MMOL/L (ref 132–146)
SP GR UR STRIP: 1.01 (ref 1–1.03)
TRIGL SERPL-MCNC: 112 MG/DL (ref 0–149)
TSH SERPL-MCNC: 2.52 UIU/ML (ref 0.27–4.2)
UROBILINOGEN UR STRIP-ACNC: 0.2 E.U./DL
VLDLC SERPL CALC-MCNC: 22 MG/DL
WBC # BLD: 9.2 E9/L (ref 4.5–11.5)

## 2023-03-18 LAB — PSA SERPL-MCNC: 1.45 NG/ML (ref 0–4)

## 2023-03-21 ENCOUNTER — OFFICE VISIT (OUTPATIENT)
Dept: FAMILY MEDICINE CLINIC | Age: 83
End: 2023-03-21

## 2023-03-21 VITALS
WEIGHT: 183.6 LBS | DIASTOLIC BLOOD PRESSURE: 62 MMHG | HEART RATE: 73 BPM | SYSTOLIC BLOOD PRESSURE: 132 MMHG | OXYGEN SATURATION: 93 % | HEIGHT: 69 IN | TEMPERATURE: 97.3 F | RESPIRATION RATE: 18 BRPM | BODY MASS INDEX: 27.19 KG/M2

## 2023-03-21 DIAGNOSIS — Z00.00 MEDICARE ANNUAL WELLNESS VISIT, SUBSEQUENT: Primary | ICD-10-CM

## 2023-03-21 DIAGNOSIS — J44.9 CHRONIC OBSTRUCTIVE PULMONARY DISEASE, UNSPECIFIED COPD TYPE (HCC): ICD-10-CM

## 2023-03-21 DIAGNOSIS — I87.2 VENOUS INSUFFICIENCY (CHRONIC) (PERIPHERAL): ICD-10-CM

## 2023-03-21 DIAGNOSIS — C61 PROSTATE CANCER (HCC): ICD-10-CM

## 2023-03-21 DIAGNOSIS — I10 ESSENTIAL HYPERTENSION: ICD-10-CM

## 2023-03-21 SDOH — ECONOMIC STABILITY: FOOD INSECURITY: WITHIN THE PAST 12 MONTHS, THE FOOD YOU BOUGHT JUST DIDN'T LAST AND YOU DIDN'T HAVE MONEY TO GET MORE.: NEVER TRUE

## 2023-03-21 SDOH — ECONOMIC STABILITY: HOUSING INSECURITY
IN THE LAST 12 MONTHS, WAS THERE A TIME WHEN YOU DID NOT HAVE A STEADY PLACE TO SLEEP OR SLEPT IN A SHELTER (INCLUDING NOW)?: NO

## 2023-03-21 SDOH — ECONOMIC STABILITY: INCOME INSECURITY: HOW HARD IS IT FOR YOU TO PAY FOR THE VERY BASICS LIKE FOOD, HOUSING, MEDICAL CARE, AND HEATING?: NOT HARD AT ALL

## 2023-03-21 SDOH — ECONOMIC STABILITY: FOOD INSECURITY: WITHIN THE PAST 12 MONTHS, YOU WORRIED THAT YOUR FOOD WOULD RUN OUT BEFORE YOU GOT MONEY TO BUY MORE.: NEVER TRUE

## 2023-03-21 ASSESSMENT — PATIENT HEALTH QUESTIONNAIRE - PHQ9
SUM OF ALL RESPONSES TO PHQ QUESTIONS 1-9: 0
SUM OF ALL RESPONSES TO PHQ9 QUESTIONS 1 & 2: 0
1. LITTLE INTEREST OR PLEASURE IN DOING THINGS: 0
2. FEELING DOWN, DEPRESSED OR HOPELESS: 0

## 2023-03-21 ASSESSMENT — ENCOUNTER SYMPTOMS
COUGH: 1
SORE THROAT: 0
EYE REDNESS: 0
ABDOMINAL PAIN: 0
SHORTNESS OF BREATH: 1
BLOOD IN STOOL: 0
PHOTOPHOBIA: 0
WHEEZING: 0

## 2023-03-21 NOTE — PATIENT INSTRUCTIONS
foods include whole-grain cereals and breads, oatmeal, beans, brown rice, citrus fruits, and apples.     Eat lean proteins. Heart-healthy proteins include seafood, lean meats and poultry, eggs, beans, peas, nuts, seeds, and soy products.     Limit drinks and foods with added sugar. These include candy, desserts, and soda pop. Lifestyle changes    If your doctor recommends it, get more exercise. Walking is a good choice. Bit by bit, increase the amount you walk every day. Try for at least 30 minutes on most days of the week. You also may want to swim, bike, or do other activities.     Do not smoke. If you need help quitting, talk to your doctor about stop-smoking programs and medicines. These can increase your chances of quitting for good. Quitting smoking may be the most important step you can take to protect your heart. It is never too late to quit.     Limit alcohol to 2 drinks a day for men and 1 drink a day for women. Too much alcohol can cause health problems.     Manage other health problems such as diabetes, high blood pressure, and high cholesterol. If you think you may have a problem with alcohol or drug use, talk to your doctor. Medicines    Take your medicines exactly as prescribed. Call your doctor if you think you are having a problem with your medicine.     If your doctor recommends aspirin, take the amount directed each day. Make sure you take aspirin and not another kind of pain reliever, such as acetaminophen (Tylenol). When should you call for help? Call 911 if you have symptoms of a heart attack. These may include:    Chest pain or pressure, or a strange feeling in the chest.     Sweating.     Shortness of breath.     Pain, pressure, or a strange feeling in the back, neck, jaw, or upper belly or in one or both shoulders or arms.     Lightheadedness or sudden weakness.     A fast or irregular heartbeat.    After you call 911, the  may tell you to chew 1 adult-strength or 2 to 4

## 2023-03-21 NOTE — PROGRESS NOTES
dependent over 10 years but has carved out a fullfilling life. 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 Authorizing Provider   furosemide (LASIX) 20 MG tablet Take 1 tablet by mouth daily When symptoms of edema present  Patient taking differently: Take 20 mg by mouth as needed When symptoms of edema present Yes Ernestina Choudhary MD   albuterol sulfate HFA (PROAIR HFA) 108 (90 Base) MCG/ACT inhaler Inhale 2 puffs into the lungs every 6 hours as needed for Wheezing Yes Giuseppe Foster MD   valsartan (DIOVAN) 160 MG tablet Take 1 tablet by mouth daily Yes Ernestina Choudhary MD   dilTIAZem (CARDIZEM CD) 180 MG extended release capsule TAKE 1 CAPSULE DAILY Yes Ernestina Choudhary MD   mometasone-formoterol (DULERA) 200-5 MCG/ACT inhaler Inhale 2 puffs into the lungs in the morning and 2 puffs in the evening. Yes Giuseppe Foster MD   aclidinium (TUDORZA PRESSAIR) 400 MCG/ACT AEPB inhaler Inhale 1 puff into the lungs in the morning and 1 puff in the evening.  Yes Giuseppe Foster MD   OXYGEN Inhale 2.5 L into the lungs Yes Historical Provider, MD   aspirin 325 MG tablet Take 500 mg by mouth daily Yes Historical Provider, MD   loratadine (CLARITIN) 10 MG tablet Take 1 tablet by mouth daily While symptomatic from allergies  Patient not taking: Reported on 3/21/2023  Ernestina Choudhary MD       [facility] (Including outside providers/suppliers regularly involved in providing care):   Patient Care Team:  Ernestina Choudhary MD as PCP - General (Family Medicine)  Ernestina Choudhary MD as PCP - Empaneled Provider  Giuseppe Foster MD as Consulting Physician (Pulmonology)     Reviewed and updated this visit:  Tobacco  Allergies  Meds  Problems  Med Hx  Surg Hx  Soc Hx  Fam Hx             OFFICE NOTE    3/21/23  Name: Irene UP:5/06/2074   Sex:male   Age:83

## 2023-09-15 DIAGNOSIS — I10 ESSENTIAL HYPERTENSION: ICD-10-CM

## 2023-09-18 DIAGNOSIS — I10 ESSENTIAL HYPERTENSION: ICD-10-CM

## 2023-09-18 RX ORDER — DILTIAZEM HYDROCHLORIDE 180 MG/1
CAPSULE, COATED, EXTENDED RELEASE ORAL
Qty: 90 CAPSULE | Refills: 3 | Status: SHIPPED | OUTPATIENT
Start: 2023-09-18

## 2023-09-18 RX ORDER — DILTIAZEM HYDROCHLORIDE 180 MG/1
CAPSULE, COATED, EXTENDED RELEASE ORAL
Qty: 90 CAPSULE | Refills: 3 | OUTPATIENT
Start: 2023-09-18

## 2023-09-18 RX ORDER — VALSARTAN 160 MG/1
160 TABLET ORAL DAILY
Qty: 90 TABLET | Refills: 3 | OUTPATIENT
Start: 2023-09-18

## 2023-09-18 RX ORDER — VALSARTAN 160 MG/1
160 TABLET ORAL DAILY
Qty: 90 TABLET | Refills: 3 | Status: SHIPPED | OUTPATIENT
Start: 2023-09-18

## 2023-09-18 NOTE — TELEPHONE ENCOUNTER
Last Appointment:  3/21/2023  Future Appointments   Date Time Provider [address]   11/8/2023  2:45 PM Dereje Ferris MD [facility]

## 2023-10-03 ENCOUNTER — OFFICE VISIT (OUTPATIENT)
Dept: FAMILY MEDICINE CLINIC | Age: 83
End: 2023-10-03
Payer: MEDICARE

## 2023-10-03 VITALS
OXYGEN SATURATION: 94 % | HEART RATE: 58 BPM | SYSTOLIC BLOOD PRESSURE: 124 MMHG | BODY MASS INDEX: 26.51 KG/M2 | RESPIRATION RATE: 17 BRPM | DIASTOLIC BLOOD PRESSURE: 72 MMHG | TEMPERATURE: 97 F | HEIGHT: 69 IN | WEIGHT: 179 LBS

## 2023-10-03 DIAGNOSIS — I10 HYPERTENSION, UNSPECIFIED TYPE: Primary | ICD-10-CM

## 2023-10-03 DIAGNOSIS — I48.0 PAF (PAROXYSMAL ATRIAL FIBRILLATION) (HCC): ICD-10-CM

## 2023-10-03 LAB — PSA SERPL-MCNC: 1.42 NG/ML (ref 0–4)

## 2023-10-03 PROCEDURE — 99214 OFFICE O/P EST MOD 30 MIN: CPT | Performed by: FAMILY MEDICINE

## 2023-10-03 PROCEDURE — 3074F SYST BP LT 130 MM HG: CPT | Performed by: FAMILY MEDICINE

## 2023-10-03 PROCEDURE — 1123F ACP DISCUSS/DSCN MKR DOCD: CPT | Performed by: FAMILY MEDICINE

## 2023-10-03 PROCEDURE — 93000 ELECTROCARDIOGRAM COMPLETE: CPT | Performed by: FAMILY MEDICINE

## 2023-10-03 PROCEDURE — G0008 ADMIN INFLUENZA VIRUS VAC: HCPCS | Performed by: FAMILY MEDICINE

## 2023-10-03 PROCEDURE — 90694 VACC AIIV4 NO PRSRV 0.5ML IM: CPT | Performed by: FAMILY MEDICINE

## 2023-10-03 PROCEDURE — 3078F DIAST BP <80 MM HG: CPT | Performed by: FAMILY MEDICINE

## 2023-10-03 RX ORDER — FUROSEMIDE 20 MG/1
20 TABLET ORAL PRN
Qty: 90 TABLET | Refills: 1 | Status: SHIPPED | OUTPATIENT
Start: 2023-10-03

## 2023-10-03 ASSESSMENT — ENCOUNTER SYMPTOMS
ABDOMINAL PAIN: 0
PHOTOPHOBIA: 0
SINUS PAIN: 0
WHEEZING: 1
DIARRHEA: 0
COUGH: 0
EYE REDNESS: 0
SORE THROAT: 0
BACK PAIN: 0
SHORTNESS OF BREATH: 1
CONSTIPATION: 0
TROUBLE SWALLOWING: 0
VOMITING: 0
BLOOD IN STOOL: 0
SINUS PRESSURE: 1

## 2023-10-03 NOTE — PROGRESS NOTES
OFFICE NOTE    10/3/23  Name: Anali Fowler  UCW:[id number]   Sex:male   Age:83 y.o. SUBJECTIVE  Chief Complaint   Patient presents with    Medication Refill    Blister     Blisters on the left ankle        HPI comes in for checkup and refills. Wonders if Diltiazem is necessary. Feels his feet swell more on it. Review of Systems   Constitutional:  Positive for fatigue. Negative for activity change, appetite change, fever and unexpected weight change. HENT:  Positive for congestion and sinus pressure. Negative for ear pain, hearing loss, sinus pain, sore throat and trouble swallowing. Eyes:  Negative for photophobia, redness and visual disturbance. Respiratory:  Positive for shortness of breath and wheezing. Negative for cough. Cardiovascular:  Positive for leg swelling. Negative for chest pain and palpitations. Gastrointestinal:  Negative for abdominal pain, blood in stool, constipation, diarrhea and vomiting. Endocrine: Negative for cold intolerance, polydipsia and polyuria. Genitourinary:  Positive for frequency and urgency. Negative for difficulty urinating, genital sores and hematuria. Musculoskeletal:  Negative for arthralgias, back pain, gait problem and joint swelling. Skin:  Negative for pallor and rash. Allergic/Immunologic: Negative for food allergies. Neurological:  Positive for weakness. Negative for dizziness, tremors, seizures, syncope, numbness and headaches. Hematological:  Negative for adenopathy. Does not bruise/bleed easily. Psychiatric/Behavioral:  Negative for agitation, dysphoric mood, hallucinations and sleep disturbance. The patient is nervous/anxious. All other systems reviewed and are negative.            Current Outpatient Medications:     furosemide (LASIX) 20 MG tablet, Take 1 tablet by mouth as needed (for edema) When symptoms of edema present, Disp: 90 tablet, Rfl: 1    dilTIAZem (CARDIZEM CD) 180 MG extended release capsule, TAKE 1 CAPSULE DAILY,

## 2024-03-04 ENCOUNTER — OFFICE VISIT (OUTPATIENT)
Dept: FAMILY MEDICINE CLINIC | Age: 84
End: 2024-03-04
Payer: MEDICARE

## 2024-03-04 VITALS
WEIGHT: 184 LBS | TEMPERATURE: 97.1 F | HEIGHT: 69 IN | OXYGEN SATURATION: 94 % | SYSTOLIC BLOOD PRESSURE: 126 MMHG | BODY MASS INDEX: 27.25 KG/M2 | HEART RATE: 100 BPM | RESPIRATION RATE: 17 BRPM | DIASTOLIC BLOOD PRESSURE: 74 MMHG

## 2024-03-04 DIAGNOSIS — J43.1 PANLOBULAR EMPHYSEMA (HCC): Primary | ICD-10-CM

## 2024-03-04 DIAGNOSIS — E78.49 OTHER HYPERLIPIDEMIA: ICD-10-CM

## 2024-03-04 DIAGNOSIS — I48.0 PAF (PAROXYSMAL ATRIAL FIBRILLATION) (HCC): ICD-10-CM

## 2024-03-04 DIAGNOSIS — C61 PROSTATE CANCER (HCC): ICD-10-CM

## 2024-03-04 DIAGNOSIS — I10 ESSENTIAL HYPERTENSION: ICD-10-CM

## 2024-03-04 PROCEDURE — 3074F SYST BP LT 130 MM HG: CPT | Performed by: FAMILY MEDICINE

## 2024-03-04 PROCEDURE — 93000 ELECTROCARDIOGRAM COMPLETE: CPT | Performed by: FAMILY MEDICINE

## 2024-03-04 PROCEDURE — 3078F DIAST BP <80 MM HG: CPT | Performed by: FAMILY MEDICINE

## 2024-03-04 PROCEDURE — 99214 OFFICE O/P EST MOD 30 MIN: CPT | Performed by: FAMILY MEDICINE

## 2024-03-04 PROCEDURE — 1123F ACP DISCUSS/DSCN MKR DOCD: CPT | Performed by: FAMILY MEDICINE

## 2024-03-04 ASSESSMENT — PATIENT HEALTH QUESTIONNAIRE - PHQ9
2. FEELING DOWN, DEPRESSED OR HOPELESS: 0
SUM OF ALL RESPONSES TO PHQ QUESTIONS 1-9: 0
SUM OF ALL RESPONSES TO PHQ QUESTIONS 1-9: 0
SUM OF ALL RESPONSES TO PHQ9 QUESTIONS 1 & 2: 0
1. LITTLE INTEREST OR PLEASURE IN DOING THINGS: 0
SUM OF ALL RESPONSES TO PHQ QUESTIONS 1-9: 0
SUM OF ALL RESPONSES TO PHQ QUESTIONS 1-9: 0

## 2024-03-04 ASSESSMENT — ENCOUNTER SYMPTOMS
COLOR CHANGE: 0
TROUBLE SWALLOWING: 0
SORE THROAT: 0
SINUS PRESSURE: 1
PHOTOPHOBIA: 0
CONSTIPATION: 0
SHORTNESS OF BREATH: 1
SINUS PAIN: 0
ABDOMINAL PAIN: 0
VOMITING: 0
BACK PAIN: 1
DIARRHEA: 0
EYE REDNESS: 0
WHEEZING: 0
BLOOD IN STOOL: 0
COUGH: 0

## 2024-03-04 NOTE — PROGRESS NOTES
OFFICE NOTE    3/4/24  Name: Mitchell Doyle  :1940   Sex:male   Age:83 y.o.      SUBJECTIVE  Chief Complaint   Patient presents with    Leg Swelling     Bilateral        HPI May have had Covid over holidays, followed by a second virus. Lower legs swelling had 2 take several days on recliner    Review of Systems   Constitutional:  Positive for activity change and fatigue. Negative for appetite change, fever and unexpected weight change.   HENT:  Positive for congestion and sinus pressure. Negative for ear pain, hearing loss, sinus pain, sore throat and trouble swallowing.    Eyes:  Negative for photophobia, redness and visual disturbance.   Respiratory:  Positive for shortness of breath. Negative for cough and wheezing.    Cardiovascular:  Positive for leg swelling. Negative for chest pain and palpitations.   Gastrointestinal:  Negative for abdominal pain, blood in stool, constipation, diarrhea and vomiting.   Endocrine: Negative for cold intolerance, polydipsia and polyuria.   Genitourinary:  Positive for frequency and urgency. Negative for difficulty urinating, dysuria, flank pain, genital sores and hematuria.   Musculoskeletal:  Positive for arthralgias and back pain. Negative for joint swelling.   Skin:  Negative for color change, pallor and rash.   Allergic/Immunologic: Negative for food allergies.   Neurological:  Positive for weakness and numbness. Negative for dizziness, tremors, seizures, syncope and headaches.   Hematological:  Negative for adenopathy. Does not bruise/bleed easily.   Psychiatric/Behavioral:  Negative for agitation, confusion, dysphoric mood, hallucinations and sleep disturbance. The patient is not nervous/anxious.             Current Outpatient Medications:     aclidinium (TUDORZA PRESSAIR) 400 MCG/ACT AEPB inhaler, Inhale 1 puff into the lungs in the morning and 1 puff in the evening., Disp: 3 each, Rfl: 3    furosemide (LASIX) 20 MG tablet, Take 1 tablet by mouth as needed (for

## 2024-04-23 DIAGNOSIS — C61 PROSTATE CANCER (HCC): ICD-10-CM

## 2024-04-23 DIAGNOSIS — E78.49 OTHER HYPERLIPIDEMIA: ICD-10-CM

## 2024-04-23 DIAGNOSIS — I48.0 PAF (PAROXYSMAL ATRIAL FIBRILLATION) (HCC): ICD-10-CM

## 2024-04-23 LAB
ALBUMIN: 4.1 G/DL (ref 3.5–5.2)
ALP BLD-CCNC: 65 U/L (ref 40–129)
ALT SERPL-CCNC: 13 U/L (ref 0–40)
ANION GAP SERPL CALCULATED.3IONS-SCNC: 13 MMOL/L (ref 7–16)
AST SERPL-CCNC: 18 U/L (ref 0–39)
BASOPHILS ABSOLUTE: 0.05 K/UL (ref 0–0.2)
BASOPHILS RELATIVE PERCENT: 0 % (ref 0–2)
BILIRUB SERPL-MCNC: 0.6 MG/DL (ref 0–1.2)
BUN BLDV-MCNC: 30 MG/DL (ref 6–23)
CALCIUM SERPL-MCNC: 9.7 MG/DL (ref 8.6–10.2)
CHLORIDE BLD-SCNC: 98 MMOL/L (ref 98–107)
CHOLESTEROL: 248 MG/DL
CO2: 30 MMOL/L (ref 22–29)
CREAT SERPL-MCNC: 0.7 MG/DL (ref 0.7–1.2)
EOSINOPHILS ABSOLUTE: 0.09 K/UL (ref 0.05–0.5)
EOSINOPHILS RELATIVE PERCENT: 1 % (ref 0–6)
GFR SERPL CREATININE-BSD FRML MDRD: >90 ML/MIN/1.73M2
GLUCOSE BLD-MCNC: 121 MG/DL (ref 74–99)
HCT VFR BLD CALC: 45.3 % (ref 37–54)
HDLC SERPL-MCNC: 85 MG/DL
HEMOGLOBIN: 13.5 G/DL (ref 12.5–16.5)
IMMATURE GRANULOCYTES %: 1 % (ref 0–5)
IMMATURE GRANULOCYTES ABSOLUTE: 0.07 K/UL (ref 0–0.58)
LDL CHOLESTEROL: 139 MG/DL
LYMPHOCYTES ABSOLUTE: 1.39 K/UL (ref 1.5–4)
LYMPHOCYTES RELATIVE PERCENT: 11 % (ref 20–42)
MAGNESIUM: 2 MG/DL (ref 1.6–2.6)
MCH RBC QN AUTO: 29.2 PG (ref 26–35)
MCHC RBC AUTO-ENTMCNC: 29.8 G/DL (ref 32–34.5)
MCV RBC AUTO: 97.8 FL (ref 80–99.9)
MONOCYTES ABSOLUTE: 0.91 K/UL (ref 0.1–0.95)
MONOCYTES RELATIVE PERCENT: 7 % (ref 2–12)
NEUTROPHILS ABSOLUTE: 10.02 K/UL (ref 1.8–7.3)
NEUTROPHILS RELATIVE PERCENT: 80 % (ref 43–80)
PDW BLD-RTO: 15 % (ref 11.5–15)
PLATELET # BLD: 430 K/UL (ref 130–450)
PMV BLD AUTO: 9.9 FL (ref 7–12)
POTASSIUM SERPL-SCNC: 4.8 MMOL/L (ref 3.5–5)
PROSTATE SPECIFIC ANTIGEN: 2.97 NG/ML (ref 0–4)
RBC # BLD: 4.63 M/UL (ref 3.8–5.8)
SODIUM BLD-SCNC: 141 MMOL/L (ref 132–146)
TOTAL PROTEIN: 6.7 G/DL (ref 6.4–8.3)
TRIGL SERPL-MCNC: 121 MG/DL
TSH SERPL DL<=0.05 MIU/L-ACNC: 1.39 UIU/ML (ref 0.27–4.2)
VLDLC SERPL CALC-MCNC: 24 MG/DL
WBC # BLD: 12.5 K/UL (ref 4.5–11.5)

## 2024-06-10 ENCOUNTER — TELEPHONE (OUTPATIENT)
Dept: FAMILY MEDICINE CLINIC | Age: 84
End: 2024-06-10

## 2024-06-10 DIAGNOSIS — J96.11 CHRONIC RESPIRATORY FAILURE WITH HYPOXIA (HCC): Primary | ICD-10-CM

## 2024-06-10 NOTE — TELEPHONE ENCOUNTER
Mitchell's Handicap Placard is . He would like a new script for one today if possible.    He wants to pick this up as soon as it is available.

## 2024-08-26 ENCOUNTER — TELEPHONE (OUTPATIENT)
Dept: FAMILY MEDICINE CLINIC | Age: 84
End: 2024-08-26

## 2024-08-26 NOTE — TELEPHONE ENCOUNTER
Patient's daughter Gina sent Dr. Price a message asking that we call her father to schedule an appointment.     I called and spoke with patient and he stated that he was having some chest discomfort. I did clarify if this was located by the heart and he stated no it was not. It was more when he coughs and the pain is going into the back.    Dr. Price stated that he could see patient on Wednesday through [facility].  Patient was informed of this and stated that he will come in then.

## 2024-08-28 ENCOUNTER — OFFICE VISIT (OUTPATIENT)
Dept: FAMILY MEDICINE CLINIC | Age: 84
End: 2024-08-28

## 2024-08-28 VITALS
TEMPERATURE: 97.1 F | RESPIRATION RATE: 18 BRPM | SYSTOLIC BLOOD PRESSURE: 124 MMHG | DIASTOLIC BLOOD PRESSURE: 84 MMHG | OXYGEN SATURATION: 97 % | HEART RATE: 100 BPM | HEIGHT: 69 IN | BODY MASS INDEX: 24.44 KG/M2 | WEIGHT: 165 LBS

## 2024-08-28 DIAGNOSIS — M80.00XA AGE-RELATED OSTEOPOROSIS WITH CURRENT PATHOLOGICAL FRACTURE, INITIAL ENCOUNTER: ICD-10-CM

## 2024-08-28 DIAGNOSIS — R09.89 CHEST CONGESTION: Primary | ICD-10-CM

## 2024-08-28 DIAGNOSIS — J96.11 CHRONIC RESPIRATORY FAILURE WITH HYPOXIA (HCC): ICD-10-CM

## 2024-08-28 DIAGNOSIS — Z91.81 AT HIGH RISK FOR FALLS: ICD-10-CM

## 2024-08-28 LAB
Lab: NORMAL
PERFORMING INSTRUMENT: NORMAL
QC PASS/FAIL: NORMAL
SARS-COV-2, POC: NORMAL

## 2024-08-28 RX ORDER — DILTIAZEM HYDROCHLORIDE EXTENDED-RELEASE TABLETS 120 MG/1
120 TABLET, EXTENDED RELEASE ORAL DAILY
COMMUNITY
Start: 2024-08-20

## 2024-08-28 RX ORDER — DILTIAZEM HYDROCHLORIDE EXTENDED-RELEASE TABLETS 240 MG/1
240 TABLET, EXTENDED RELEASE ORAL DAILY
COMMUNITY
Start: 2024-08-20

## 2024-08-28 SDOH — ECONOMIC STABILITY: FOOD INSECURITY: WITHIN THE PAST 12 MONTHS, YOU WORRIED THAT YOUR FOOD WOULD RUN OUT BEFORE YOU GOT MONEY TO BUY MORE.: NEVER TRUE

## 2024-08-28 SDOH — ECONOMIC STABILITY: INCOME INSECURITY: HOW HARD IS IT FOR YOU TO PAY FOR THE VERY BASICS LIKE FOOD, HOUSING, MEDICAL CARE, AND HEATING?: NOT HARD AT ALL

## 2024-08-28 SDOH — ECONOMIC STABILITY: FOOD INSECURITY: WITHIN THE PAST 12 MONTHS, THE FOOD YOU BOUGHT JUST DIDN'T LAST AND YOU DIDN'T HAVE MONEY TO GET MORE.: NEVER TRUE

## 2024-08-28 NOTE — PROGRESS NOTES
OFFICE NOTE    24  Name: Mitchell Doyle  :1940   Sex:male   Age:84 y.o.      SUBJECTIVE  Chief Complaint   Patient presents with    Abdominal Pain     In the Upper Quadrants     Back Pain     Upper portion of the back     Congestion       HPI pt reports he has had pain radiating from his back around both sides. Coughing and moving made it worse. Might have affected breathing some, but not much. Daughter Dr. Doyle requested we see him and had him come in while I was on [employer]    Review of Systems   Weight down almost 20 lbs in past 5-6 mos but almost none last 3 mos  O2 depenedent over 10 years    Current Outpatient Medications:     dilTIAZem HCl  MG TB24, Take 240 mg by mouth daily Take 1 Tablet by mouth once daily with 120 mg tablet, Disp: , Rfl:     dilTIAZem (CARDIZEM LA) 120 MG TB24 extended release tablet, Take 1 capsule by mouth daily TAKE 1 TABLET BY MOUTH ONCE DAILY WITH 240 MG TABLET, Disp: , Rfl:     mometasone-formoterol (DULERA) 200-5 MCG/ACT inhaler, Inhale 2 puffs into the lungs in the morning and 2 puffs in the evening., Disp: 3 each, Rfl: 3    Handicap Placard MISC, by Does not apply route 5 years, Disp: 1 each, Rfl: 0    aclidinium (TUDORZA PRESSAIR) 400 MCG/ACT AEPB inhaler, Inhale 1 puff into the lungs in the morning and 1 puff in the evening., Disp: 3 each, Rfl: 3    furosemide (LASIX) 20 MG tablet, Take 1 tablet by mouth as needed (for edema) When symptoms of edema present, Disp: 90 tablet, Rfl: 1    albuterol sulfate HFA (PROAIR HFA) 108 (90 Base) MCG/ACT inhaler, Inhale 2 puffs into the lungs every 6 hours as needed for Wheezing, Disp: 3 each, Rfl: 5    OXYGEN, Inhale 2.5 L into the lungs, Disp: , Rfl:     aspirin 325 MG tablet, Take 1 tablet by mouth daily, Disp: , Rfl:     predniSONE (DELTASONE) 10 MG tablet, Take 1 tablet by mouth 2 times daily (Patient taking differently: Take 1 tablet by mouth 2 times daily Take 10 mg in morning and 5 mg at night time), Disp: 60  and time.   Psychiatric:         Mood and Affect: Mood normal.         Behavior: Behavior normal.           Mitchell was seen today for abdominal pain, back pain and congestion.    Diagnoses and all orders for this visit:    Chest congestion  -     POCT COVID-19, Antigen  Covid test negative  Age-related osteoporosis with current pathological fracture, initial encounter  -     XR CHEST (2 VW); Future  Strongly suspected. Wedge compression two mid thoracic vertebrae and may be additional one lower thoracic not well seen explaining patient's symptoms  Chronic respiratory failure with hypoxia (HCC)  -     XR CHEST (2 VW); Future    At high risk for falls  Feel he needs DEXA and may need Prolia shots. BMD in back was normal 5 years ago but osteopenic in hips. Prednisone would probably be the culprit here    Keep apt in fall.    No follow-ups on file.    Electronically signed by Rohan Price MD on 8/28/24 at 11:16 AM EDT3  On the basis of positive falls risk screening, assessment and plan is as follows: patient will follow up in 3 month(s) for further evaluation. Will evaluate for osteoorosis. Uses walker

## 2024-09-10 DIAGNOSIS — M80.00XG AGE-RELATED OSTEOPOROSIS WITH CURRENT PATHOLOGICAL FRACTURE WITH DELAYED HEALING, SUBSEQUENT ENCOUNTER: Primary | ICD-10-CM

## 2024-09-17 ENCOUNTER — TELEPHONE (OUTPATIENT)
Dept: FAMILY MEDICINE CLINIC | Age: 84
End: 2024-09-17

## 2024-09-18 ENCOUNTER — OFFICE VISIT (OUTPATIENT)
Dept: FAMILY MEDICINE CLINIC | Age: 84
End: 2024-09-18

## 2024-09-18 VITALS
HEART RATE: 93 BPM | RESPIRATION RATE: 17 BRPM | DIASTOLIC BLOOD PRESSURE: 76 MMHG | OXYGEN SATURATION: 91 % | SYSTOLIC BLOOD PRESSURE: 118 MMHG | BODY MASS INDEX: 23.11 KG/M2 | WEIGHT: 156 LBS | HEIGHT: 69 IN | TEMPERATURE: 97.1 F

## 2024-09-18 DIAGNOSIS — J43.1 PANLOBULAR EMPHYSEMA (HCC): Primary | ICD-10-CM

## 2024-09-18 DIAGNOSIS — J44.1 COPD EXACERBATION (HCC): ICD-10-CM

## 2024-09-18 RX ORDER — DOXYCYCLINE HYCLATE 100 MG
100 TABLET ORAL 2 TIMES DAILY
Qty: 20 TABLET | Refills: 0 | Status: SHIPPED | OUTPATIENT
Start: 2024-09-18 | End: 2024-09-28

## 2024-09-18 RX ORDER — SIMETHICONE 80 MG
80 TABLET,CHEWABLE ORAL 4 TIMES DAILY PRN
Qty: 180 TABLET | Refills: 3 | Status: SHIPPED | OUTPATIENT
Start: 2024-09-18

## 2024-09-18 RX ORDER — LEVALBUTEROL INHALATION SOLUTION 1.25 MG/3ML
1 SOLUTION RESPIRATORY (INHALATION) EVERY 8 HOURS PRN
Qty: 90 EACH | Refills: 5 | Status: SHIPPED | OUTPATIENT
Start: 2024-09-18

## 2024-09-24 ENCOUNTER — TELEPHONE (OUTPATIENT)
Dept: FAMILY MEDICINE CLINIC | Age: 84
End: 2024-09-24

## 2024-09-24 NOTE — TELEPHONE ENCOUNTER
I called and spoke with pharmacy and they stated patient has an HMO so part B will not cover.     The next step would be an appeal.

## 2024-09-24 NOTE — TELEPHONE ENCOUNTER
Check with Debora not sure. Duonebs were problematic. Using long acting beta agonist, steroid combination. Want to minimize tachycardia

## 2024-09-24 NOTE — TELEPHONE ENCOUNTER
Started the appeal. The lady with the appeal department stated we should get a fax within the next 7 business days to find out if there was a change of mind on the denial.

## 2024-09-24 NOTE — TELEPHONE ENCOUNTER
There is a possibility that this medication could be covered under part B of his medicare. He could call his insurance and see, or we could call his local pharmacy and see what they say. Our reasons for ordering are did poorly on Duonebs and reflex tachycardia is a concern in this elderly gentleman who is on a long acting beta agonist and would be using the xopenex at near maximal dose. Albuterol qid would be the alternative and almost certainly would induce tachycardia, limiting its use. There is no point going back to his formulary as they have already said they won't cover this under part D. He could also pay cash.

## 2024-09-30 ENCOUNTER — HOSPITAL ENCOUNTER (INPATIENT)
Age: 84
LOS: 2 days | Discharge: HOME | DRG: 605 | End: 2024-10-02
Attending: INTERNAL MEDICINE | Admitting: INTERNAL MEDICINE
Payer: MEDICARE

## 2024-09-30 PROBLEM — R13.10 DYSPHAGIA: Status: ACTIVE | Noted: 2024-09-30

## 2024-09-30 PROCEDURE — 1200000000 HC SEMI PRIVATE

## 2024-09-30 PROCEDURE — 0CJS8ZZ INSPECTION OF LARYNX, VIA NATURAL OR ARTIFICIAL OPENING ENDOSCOPIC: ICD-10-PCS | Performed by: OTOLARYNGOLOGY

## 2024-09-30 PROCEDURE — 6360000002 HC RX W HCPCS: Performed by: INTERNAL MEDICINE

## 2024-09-30 PROCEDURE — 94640 AIRWAY INHALATION TREATMENT: CPT

## 2024-09-30 PROCEDURE — 6370000000 HC RX 637 (ALT 250 FOR IP): Performed by: INTERNAL MEDICINE

## 2024-09-30 PROCEDURE — 2580000003 HC RX 258: Performed by: INTERNAL MEDICINE

## 2024-09-30 PROCEDURE — 94664 DEMO&/EVAL PT USE INHALER: CPT

## 2024-09-30 RX ORDER — DEXAMETHASONE SODIUM PHOSPHATE 4 MG/ML
4 INJECTION, SOLUTION INTRA-ARTICULAR; INTRALESIONAL; INTRAMUSCULAR; INTRAVENOUS; SOFT TISSUE EVERY 6 HOURS
Status: DISCONTINUED | OUTPATIENT
Start: 2024-09-30 | End: 2024-10-01

## 2024-09-30 RX ORDER — ONDANSETRON 2 MG/ML
4 INJECTION INTRAMUSCULAR; INTRAVENOUS EVERY 6 HOURS PRN
Status: DISCONTINUED | OUTPATIENT
Start: 2024-09-30 | End: 2024-10-02 | Stop reason: HOSPADM

## 2024-09-30 RX ORDER — POTASSIUM CHLORIDE 7.45 MG/ML
10 INJECTION INTRAVENOUS PRN
Status: DISCONTINUED | OUTPATIENT
Start: 2024-09-30 | End: 2024-10-02 | Stop reason: HOSPADM

## 2024-09-30 RX ORDER — DEXTROSE MONOHYDRATE AND SODIUM CHLORIDE 5; .45 G/100ML; G/100ML
INJECTION, SOLUTION INTRAVENOUS CONTINUOUS
Status: DISCONTINUED | OUTPATIENT
Start: 2024-09-30 | End: 2024-10-01

## 2024-09-30 RX ORDER — SODIUM CHLORIDE 9 MG/ML
INJECTION, SOLUTION INTRAVENOUS PRN
Status: DISCONTINUED | OUTPATIENT
Start: 2024-09-30 | End: 2024-10-02 | Stop reason: HOSPADM

## 2024-09-30 RX ORDER — SODIUM CHLORIDE 0.9 % (FLUSH) 0.9 %
10 SYRINGE (ML) INJECTION PRN
Status: DISCONTINUED | OUTPATIENT
Start: 2024-09-30 | End: 2024-10-02 | Stop reason: HOSPADM

## 2024-09-30 RX ORDER — SIMETHICONE 80 MG
80 TABLET,CHEWABLE ORAL 4 TIMES DAILY PRN
Status: DISCONTINUED | OUTPATIENT
Start: 2024-09-30 | End: 2024-10-02 | Stop reason: HOSPADM

## 2024-09-30 RX ORDER — POTASSIUM CHLORIDE 1500 MG/1
40 TABLET, EXTENDED RELEASE ORAL PRN
Status: DISCONTINUED | OUTPATIENT
Start: 2024-09-30 | End: 2024-10-02 | Stop reason: HOSPADM

## 2024-09-30 RX ORDER — LANOLIN ALCOHOL/MO/W.PET/CERES
3 CREAM (GRAM) TOPICAL NIGHTLY PRN
Status: DISCONTINUED | OUTPATIENT
Start: 2024-09-30 | End: 2024-10-02 | Stop reason: HOSPADM

## 2024-09-30 RX ORDER — DILTIAZEM HYDROCHLORIDE 120 MG/1
120 CAPSULE, COATED, EXTENDED RELEASE ORAL DAILY
Status: DISCONTINUED | OUTPATIENT
Start: 2024-09-30 | End: 2024-10-02 | Stop reason: HOSPADM

## 2024-09-30 RX ORDER — ACETAMINOPHEN 650 MG/1
650 SUPPOSITORY RECTAL EVERY 6 HOURS PRN
Status: DISCONTINUED | OUTPATIENT
Start: 2024-09-30 | End: 2024-10-02 | Stop reason: HOSPADM

## 2024-09-30 RX ORDER — ONDANSETRON 4 MG/1
4 TABLET, ORALLY DISINTEGRATING ORAL EVERY 8 HOURS PRN
Status: DISCONTINUED | OUTPATIENT
Start: 2024-09-30 | End: 2024-10-02 | Stop reason: HOSPADM

## 2024-09-30 RX ORDER — SODIUM CHLORIDE 0.9 % (FLUSH) 0.9 %
10 SYRINGE (ML) INJECTION EVERY 12 HOURS SCHEDULED
Status: DISCONTINUED | OUTPATIENT
Start: 2024-09-30 | End: 2024-10-02 | Stop reason: HOSPADM

## 2024-09-30 RX ORDER — ALBUTEROL SULFATE 0.83 MG/ML
2.5 SOLUTION RESPIRATORY (INHALATION) EVERY 6 HOURS PRN
Status: DISCONTINUED | OUTPATIENT
Start: 2024-09-30 | End: 2024-10-02 | Stop reason: HOSPADM

## 2024-09-30 RX ORDER — SENNOSIDES A AND B 8.6 MG/1
1 TABLET, FILM COATED ORAL DAILY PRN
Status: DISCONTINUED | OUTPATIENT
Start: 2024-09-30 | End: 2024-10-02 | Stop reason: HOSPADM

## 2024-09-30 RX ORDER — DILTIAZEM HYDROCHLORIDE 240 MG/1
240 CAPSULE, COATED, EXTENDED RELEASE ORAL DAILY
Status: DISCONTINUED | OUTPATIENT
Start: 2024-09-30 | End: 2024-10-02 | Stop reason: HOSPADM

## 2024-09-30 RX ORDER — MAGNESIUM SULFATE IN WATER 40 MG/ML
2000 INJECTION, SOLUTION INTRAVENOUS PRN
Status: DISCONTINUED | OUTPATIENT
Start: 2024-09-30 | End: 2024-10-02 | Stop reason: HOSPADM

## 2024-09-30 RX ORDER — ACETAMINOPHEN 325 MG/1
650 TABLET ORAL EVERY 6 HOURS PRN
Status: DISCONTINUED | OUTPATIENT
Start: 2024-09-30 | End: 2024-10-02 | Stop reason: HOSPADM

## 2024-09-30 RX ADMIN — DILTIAZEM HYDROCHLORIDE 240 MG: 240 CAPSULE, COATED, EXTENDED RELEASE ORAL at 18:01

## 2024-09-30 RX ADMIN — DEXTROSE AND SODIUM CHLORIDE: 5; 450 INJECTION, SOLUTION INTRAVENOUS at 18:32

## 2024-09-30 RX ADMIN — DILTIAZEM HYDROCHLORIDE 120 MG: 120 CAPSULE, COATED, EXTENDED RELEASE ORAL at 18:01

## 2024-09-30 RX ADMIN — ARFORMOTEROL TARTRATE: 15 SOLUTION RESPIRATORY (INHALATION) at 21:03

## 2024-09-30 RX ADMIN — SODIUM CHLORIDE, PRESERVATIVE FREE 10 ML: 5 INJECTION INTRAVENOUS at 23:20

## 2024-09-30 RX ADMIN — IPRATROPIUM BROMIDE 0.5 MG: 0.5 SOLUTION RESPIRATORY (INHALATION) at 21:03

## 2024-09-30 RX ADMIN — DEXAMETHASONE SODIUM PHOSPHATE 4 MG: 4 INJECTION, SOLUTION INTRAMUSCULAR; INTRAVENOUS at 18:05

## 2024-09-30 RX ADMIN — DEXAMETHASONE SODIUM PHOSPHATE 4 MG: 4 INJECTION, SOLUTION INTRAMUSCULAR; INTRAVENOUS at 23:20

## 2024-09-30 ASSESSMENT — ENCOUNTER SYMPTOMS
CHOKING: 0
GASTROINTESTINAL NEGATIVE: 1
RHINORRHEA: 0
COLOR CHANGE: 0
WHEEZING: 0
SINUS PAIN: 0
COUGH: 0
STRIDOR: 0
SINUS PRESSURE: 0
TROUBLE SWALLOWING: 1

## 2024-09-30 NOTE — PROGRESS NOTES
Bedside nursing swallow done, patient was able to swallow small sips of water. No coughing, was able to swallow safely.

## 2024-09-30 NOTE — CARE COORDINATION
Internal Medicine On-call Care Coordination Note    I was called by the ED physician because they recommended admission for this patient and I cover their PCP.  The history as I understand it after discussion with the ED physician is as follows:    The patient presented with difficulty swallowing  He [facility] went to [facility] but they sent him to [facility] due to  no ENT coverage at [place]  In the ED they found an abnormality on his CT soft tissue of neck     I placed admission orders.  Including:    Dysphagia related to hematoma from trauma:  CT neck noted  Hold ASA (h/o afib)  ENT consult  SLP eval  IV Decadron  IVF while NPO    Lung nodule:  Pulm consult    Dr. Mckinney or his coverage will see the patient tomorrow for H&P.    Electronically signed by Yariel Mckinney DO on 9/30/2024 at 4:11 PM

## 2024-09-30 NOTE — CONSULTS
Taking? Authorizing Provider   aclidinium (TUDORZA PRESSAIR) 400 MCG/ACT AEPB inhaler Inhale 1 puff into the lungs in the morning and 1 puff in the evening. 9/30/24 12/29/24 Yes Salty Roberts MD   predniSONE (DELTASONE) 10 MG tablet Take 1 tablet by mouth daily 8/30/24  Yes Salty Roberts MD   predniSONE (DELTASONE) 5 MG tablet Take 1 tablet by mouth daily 8/30/24  Yes Salty Roberts MD   mometasone-formoterol (DULERA) 200-5 MCG/ACT inhaler Inhale 2 puffs into the lungs in the morning and 2 puffs in the evening. 7/24/24  Yes Salty Roberts MD   predniSONE (DELTASONE) 10 MG tablet Take 1 tablet by mouth 2 times daily  Patient taking differently: Take 1 tablet by mouth 2 times daily Take 10 mg in morning and 5 mg at night time 7/18/24  Yes Salty Roberts MD   levalbuterol (XOPENEX) 1.25 MG/3ML nebulizer solution Take 3 mLs by nebulization every 8 hours as needed for Wheezing  Patient not taking: Reported on 9/30/2024 9/18/24   Rohan Price MD   simethicone (MYLICON) 80 MG chewable tablet Take 1 tablet by mouth 4 times daily as needed for Flatulence 9/18/24   Rohan Price MD   denosumab (PROLIA) 60 MG/ML SOSY SC injection Inject 1 mL into the skin once for 1 dose May repeat same dose in 6 mos 9/10/24 9/18/24  Rohan Price MD   dilTIAZem HCl  MG TB24 Take 240 mg by mouth daily Take 1 Tablet by mouth once daily with 120 mg tablet 8/20/24   Maurice Morejon MD   dilTIAZem (CARDIZEM LA) 120 MG TB24 extended release tablet Take 1 capsule by mouth daily TAKE 1 TABLET BY MOUTH ONCE DAILY WITH 240 MG TABLET 8/20/24   Provider, Historical, MD   Handicap Placard MISC by Does not apply route 5 years 6/10/24   Rohan Price MD   furosemide (LASIX) 20 MG tablet Take 1 tablet by mouth as needed (for edema) When symptoms of edema present 10/3/23   Rohan Price MD   albuterol sulfate HFA (PROAIR HFA) 108 (90 Base) MCG/ACT inhaler Inhale 2 puffs into the lungs every 6 hours as needed for  the nose, nasopharynx, oropharynx, hypopharynx and larynx were examined.  Examination was performed during quiet respiration and with phonation. The following findings were noted.  Findings:   Normal nasopharynx, normal epiglottis, normal tongue base, normal pyriform, normal TVC motion and mucosa, no subglottic masses or lesions, normal vocal cord movement, no vocal cord polyps. Ecchymosis over the left posterior pharyngeal wall extending down to hypopharynx with some hooding of the false cord   Condition:  Stable  Complications:  None      LABS:  CBC  Recent Labs     24  1653   WBC 15.7*   HGB 13.2*   HCT 40.1*          RADIOLOGY  CT SOFT TISSUE NECK W CONTRAST    Result Date: 2024                                      [address] [address] [phone]                                               CT Scan Report                                                  Signed    Patient: NAHED ETIENNE                                                                MR#: [id number]          : 1940                                                                Acct:[id number]            Age/Sex: 84 / M                                                                Admit Date: 24            Loc: ER                                                                                     Attending Dr:     Ordering Physician: Patrick Rosario DO   Date of Service: 24  Procedure(s): CT soft tissue neck w con  Accession Number(s): [id number]    cc: Patrick Rosario DO      INDICATION:  Thinks they might have something stuck in throat.  Pain to left side  of neck.     TECHNIQUE:  CT of the soft tissues of the neck was performed with intravenous  contrast.  Isovue 300, 70 mL was administered intravenously.     Automated

## 2024-09-30 NOTE — PROGRESS NOTES
4 Eyes Skin Assessment     NAME:  Mitchell Doyle  YOB: 1940  MEDICAL RECORD NUMBER:  [id number]    The patient is being assessed for  Admission    I agree that at least one RN has performed a thorough Head to Toe Skin Assessment on the patient. ALL assessment sites listed below have been assessed.      Areas assessed by both nurses:    Head, Face, Ears, Shoulders, Back, Chest, Arms, Elbows, Hands, Sacrum. Buttock, Coccyx, Ischium, and Legs. Feet and Heels        Does the Patient have a Wound?  Skin tear to left shin, generalized bruising, tears/scabbing due to fragile skin       Dar Prevention initiated by RN: Yes  Wound Care Orders initiated by RN: No    Pressure Injury (Stage 3,4, Unstageable, DTI, NWPT, and Complex wounds) if present, place Wound referral order by RN under : No    New Ostomies, if present place, Ostomy referral order under : No     Nurse 1 eSignature: Electronically signed by Amy Goodson RN on 9/30/24 at 6:46 PM EDT    **SHARE this note so that the co-signing nurse can place an eSignature**    Nurse 2 eSignature: Electronically signed by Marshall Salas RN on 9/30/24 at 6:53 PM EDT

## 2024-09-30 NOTE — PROGRESS NOTES
Upon entering the room  observes patient in bed calm, alert and approachable.  provided active listening, prayer, and nurtured hope.  left devotional material and information about  services. Patient seemed encouraged, engaged in conversation, and expressed gratitude. Chaplains will remain available to offer spiritual and emotional support as needed.

## 2024-10-01 ENCOUNTER — APPOINTMENT (OUTPATIENT)
Dept: GENERAL RADIOLOGY | Age: 84
DRG: 605 | End: 2024-10-01
Attending: INTERNAL MEDICINE
Payer: MEDICARE

## 2024-10-01 ENCOUNTER — APPOINTMENT (OUTPATIENT)
Dept: CT IMAGING | Age: 84
DRG: 605 | End: 2024-10-01
Attending: INTERNAL MEDICINE
Payer: MEDICARE

## 2024-10-01 PROBLEM — S10.0XXA: Status: ACTIVE | Noted: 2024-10-01

## 2024-10-01 LAB
ALBUMIN SERPL-MCNC: 3.2 G/DL (ref 3.5–5.2)
ALP SERPL-CCNC: 86 U/L (ref 40–129)
ALT SERPL-CCNC: 21 U/L (ref 0–40)
ANION GAP SERPL CALCULATED.3IONS-SCNC: 7 MMOL/L (ref 7–16)
AST SERPL-CCNC: 17 U/L (ref 0–39)
BASOPHILS # BLD: 0.01 K/UL (ref 0–0.2)
BASOPHILS NFR BLD: 0 % (ref 0–2)
BILIRUB SERPL-MCNC: 0.8 MG/DL (ref 0–1.2)
BUN SERPL-MCNC: 28 MG/DL (ref 6–23)
CALCIUM SERPL-MCNC: 8.6 MG/DL (ref 8.6–10.2)
CHLORIDE SERPL-SCNC: 100 MMOL/L (ref 98–107)
CO2 SERPL-SCNC: 35 MMOL/L (ref 22–29)
CREAT SERPL-MCNC: 0.5 MG/DL (ref 0.7–1.2)
EOSINOPHIL # BLD: 0 K/UL (ref 0.05–0.5)
EOSINOPHILS RELATIVE PERCENT: 0 % (ref 0–6)
ERYTHROCYTE [DISTWIDTH] IN BLOOD BY AUTOMATED COUNT: 14.5 % (ref 11.5–15)
GFR, ESTIMATED: >90 ML/MIN/1.73M2
GLUCOSE SERPL-MCNC: 190 MG/DL (ref 74–99)
HCT VFR BLD AUTO: 41.9 % (ref 37–54)
HGB BLD-MCNC: 12.9 G/DL (ref 12.5–16.5)
IMM GRANULOCYTES # BLD AUTO: 0.06 K/UL (ref 0–0.58)
IMM GRANULOCYTES NFR BLD: 1 % (ref 0–5)
LYMPHOCYTES NFR BLD: 0.48 K/UL (ref 1.5–4)
LYMPHOCYTES RELATIVE PERCENT: 6 % (ref 20–42)
MCH RBC QN AUTO: 29.8 PG (ref 26–35)
MCHC RBC AUTO-ENTMCNC: 30.8 G/DL (ref 32–34.5)
MCV RBC AUTO: 96.8 FL (ref 80–99.9)
MONOCYTES NFR BLD: 0.28 K/UL (ref 0.1–0.95)
MONOCYTES NFR BLD: 3 % (ref 2–12)
NEUTROPHILS NFR BLD: 91 % (ref 43–80)
NEUTS SEG NFR BLD: 7.88 K/UL (ref 1.8–7.3)
PLATELET # BLD AUTO: 271 K/UL (ref 130–450)
PMV BLD AUTO: 10.4 FL (ref 7–12)
POTASSIUM SERPL-SCNC: 4.1 MMOL/L (ref 3.5–5)
PROT SERPL-MCNC: 5.2 G/DL (ref 6.4–8.3)
RBC # BLD AUTO: 4.33 M/UL (ref 3.8–5.8)
RBC # BLD: ABNORMAL 10*6/UL
SODIUM SERPL-SCNC: 142 MMOL/L (ref 132–146)
WBC OTHER # BLD: 8.7 K/UL (ref 4.5–11.5)

## 2024-10-01 PROCEDURE — 2700000000 HC OXYGEN THERAPY PER DAY

## 2024-10-01 PROCEDURE — 1200000000 HC SEMI PRIVATE

## 2024-10-01 PROCEDURE — 2580000003 HC RX 258: Performed by: INTERNAL MEDICINE

## 2024-10-01 PROCEDURE — 97165 OT EVAL LOW COMPLEX 30 MIN: CPT

## 2024-10-01 PROCEDURE — 6360000002 HC RX W HCPCS: Performed by: INTERNAL MEDICINE

## 2024-10-01 PROCEDURE — 80053 COMPREHEN METABOLIC PANEL: CPT

## 2024-10-01 PROCEDURE — 6370000000 HC RX 637 (ALT 250 FOR IP): Performed by: INTERNAL MEDICINE

## 2024-10-01 PROCEDURE — 2500000003 HC RX 250 WO HCPCS: Performed by: RADIOLOGY

## 2024-10-01 PROCEDURE — 6360000002 HC RX W HCPCS: Performed by: OTOLARYNGOLOGY

## 2024-10-01 PROCEDURE — 31575 DIAGNOSTIC LARYNGOSCOPY: CPT | Performed by: OTOLARYNGOLOGY

## 2024-10-01 PROCEDURE — 94640 AIRWAY INHALATION TREATMENT: CPT

## 2024-10-01 PROCEDURE — 71250 CT THORAX DX C-: CPT

## 2024-10-01 PROCEDURE — 74230 X-RAY XM SWLNG FUNCJ C+: CPT

## 2024-10-01 PROCEDURE — 85025 COMPLETE CBC W/AUTO DIFF WBC: CPT

## 2024-10-01 PROCEDURE — 99231 SBSQ HOSP IP/OBS SF/LOW 25: CPT | Performed by: OTOLARYNGOLOGY

## 2024-10-01 PROCEDURE — 36415 COLL VENOUS BLD VENIPUNCTURE: CPT

## 2024-10-01 PROCEDURE — 92526 ORAL FUNCTION THERAPY: CPT | Performed by: SPEECH-LANGUAGE PATHOLOGIST

## 2024-10-01 PROCEDURE — 92611 MOTION FLUOROSCOPY/SWALLOW: CPT | Performed by: SPEECH-LANGUAGE PATHOLOGIST

## 2024-10-01 RX ORDER — DEXAMETHASONE SODIUM PHOSPHATE 10 MG/ML
10 INJECTION INTRAMUSCULAR; INTRAVENOUS EVERY 8 HOURS
Status: COMPLETED | OUTPATIENT
Start: 2024-10-01 | End: 2024-10-02

## 2024-10-01 RX ADMIN — ARFORMOTEROL TARTRATE: 15 SOLUTION RESPIRATORY (INHALATION) at 20:17

## 2024-10-01 RX ADMIN — DEXAMETHASONE SODIUM PHOSPHATE 4 MG: 4 INJECTION, SOLUTION INTRAMUSCULAR; INTRAVENOUS at 06:10

## 2024-10-01 RX ADMIN — DEXTROSE AND SODIUM CHLORIDE: 5; 450 INJECTION, SOLUTION INTRAVENOUS at 07:47

## 2024-10-01 RX ADMIN — DILTIAZEM HYDROCHLORIDE 120 MG: 120 CAPSULE, COATED, EXTENDED RELEASE ORAL at 10:42

## 2024-10-01 RX ADMIN — DILTIAZEM HYDROCHLORIDE 240 MG: 240 CAPSULE, COATED, EXTENDED RELEASE ORAL at 10:43

## 2024-10-01 RX ADMIN — BARIUM SULFATE 70 G: 0.81 POWDER, FOR SUSPENSION ORAL at 14:35

## 2024-10-01 RX ADMIN — SODIUM CHLORIDE, PRESERVATIVE FREE 10 ML: 5 INJECTION INTRAVENOUS at 21:32

## 2024-10-01 RX ADMIN — BARIUM SULFATE 10 ML: 400 PASTE ORAL at 14:34

## 2024-10-01 RX ADMIN — IPRATROPIUM BROMIDE 0.5 MG: 0.5 SOLUTION RESPIRATORY (INHALATION) at 20:17

## 2024-10-01 RX ADMIN — SODIUM CHLORIDE, PRESERVATIVE FREE 10 ML: 5 INJECTION INTRAVENOUS at 10:43

## 2024-10-01 RX ADMIN — BARIUM SULFATE 120 ML: 400 SUSPENSION ORAL at 14:35

## 2024-10-01 RX ADMIN — DEXAMETHASONE SODIUM PHOSPHATE 10 MG: 10 INJECTION INTRAMUSCULAR; INTRAVENOUS at 16:40

## 2024-10-01 RX ADMIN — IPRATROPIUM BROMIDE 0.5 MG: 0.5 SOLUTION RESPIRATORY (INHALATION) at 09:57

## 2024-10-01 RX ADMIN — ARFORMOTEROL TARTRATE: 15 SOLUTION RESPIRATORY (INHALATION) at 09:57

## 2024-10-01 ASSESSMENT — ENCOUNTER SYMPTOMS
SORE THROAT: 1
VOICE CHANGE: 1

## 2024-10-01 ASSESSMENT — PAIN SCALES - GENERAL: PAINLEVEL_OUTOF10: 0

## 2024-10-01 NOTE — PROGRESS NOTES
SPEECH/LANGUAGE PATHOLOGY  VIDEOFLUOROSCOPIC STUDY OF SWALLOWING (MBS)   and PLAN OF CARE    PATIENT NAME:  Mitchell Doyle  (male)     MRN:  [id number]    :  1940  (84 y.o.)  STATUS:  Inpatient: Room [facility]25-A    TODAY'S DATE:  10/1/2024  REFERRING PROVIDER:   SLP eval and treat  Start:  10/01/24 0800,   End:  10/01/24 0800,   ONE TIME,   Standing Count:  1 Occurrences,   R       Alberto Malloy DO   REASON FOR REFERRAL: acute on chronic dysphagia now in setting of neck trauma   EVALUATING THERAPIST: Estefania Brandt, TED      RESULTS:      DYSPHAGIA DIAGNOSIS:  functional oropharyngeal swallow for age/premorbid functioning and esophageal motility deficit suspected    DIET RECOMMENDATIONS:  Regular consistency solids (IDDSI level 7) with  thin liquids (IDDSI level 0)    FEEDING RECOMMENDATIONS:    Assistance level:  Encourage self-feeding as function allows     Compensatory strategies recommended: Thorough oral care to prevent colonization of oral bacteria   Upright in bed/ chair as tolerated  Fully alert for all PO  Double swallow  Liquid wash after thicker items to assist with clearing pharyngeal residue from retrograde flow of items above the UES     Discussed recommendations with:  charge nurse in person    Laryngeal Penetration and Aspiration:  Neither penetration nor aspiration was observed in today's study     SPEECH THERAPY  PLAN OF CARE   The dysphagia POC is established based on physician order and dysphagia diagnosis    Skilled SLP intervention for dysphagia management on acute care up to 5 x per week until goals met, pt plateaus in function and/or discharged from hospital      Conditions Requiring Skilled Therapeutic Intervention for dysphagia:    Impaired pharyngoesophageal opening resulting in obstruction of bolus flow causing barium residue to remain in pharyngeal area    SPECIFIC DYSPHAGIA INTERVENTIONS TO INCLUDE:     compensatory swallowing strategies to improve airway protection and swallow

## 2024-10-01 NOTE — CONSULTS
Pulmonary Consultation    Admit Date: 9/30/2024  Requesting Physician: Rohan Price MD      Reason for consultation:  Lung nodule      HPI:  Patient is a 84 year old male who presents from [facility] with complaints of dysphagia. Patient was hit in the neck by his cat a few days ago and began developing dysphagia. He had a CT of the neck completed he has soft tissue swelling. He was evaluated by ENT and was found to have a small hematoma. Patient was started on intravenous decadron to help decrease swelling. On CT of the neck there was incidental finding of a 9 mm right lung nodule. When asking the patient if he has had any weight loss or poor appetite he admits that before this trauma occurred he was dieting.     Patient does have known COPD Gold Class IV. He was denied previous CT imaging by his insurance. He denies any history of lung nodules. Patient was a previous smoker, quitting in 2003. He does use supplemental oxygen at home.     Patient does have some dyspnea with exertion. He denies any wheezing or increased cough.       PMH:    Past Medical History:   Diagnosis Date    COPD (chronic obstructive pulmonary disease) (HCC)     Ischemic colitis (HCC) 2008    Prostate cancer (HCC)     x2 years            PSH:   Past Surgical History:   Procedure Laterality Date    COLONOSCOPY  2009    Dr. Kilgore          Review of Systems:   Constitutional: As noted in the HPI.   Eyes: No visual changes or diplopia. No scleral icterus.  ENT: No headaches, hearing loss or vertigo. No nasal congestion, or sore throat.  Cardiovascular: No chest pain, (+) dyspnea on exertion, or palpitations.  Respiratory: See above  Gastrointestinal: No abdominal pain, nausea or emesis. No diarrhea or rectal bleeding or melena. No change in bowel habits.   Genitourinary: No dysuria, urinary frequency, or incontinence. No hematuria.  Musculoskeletal: No gait disturbance, weakness or joint complaints.  Integumentary: No  transcription errors.  If the patient is a COVID 19 isolation patient, the above physical exam reflects that of the examining physician for the day.    SRIDEVI Hatfield-DAINA     Attestation    The patient was seen and personally examined.  History is obtained through the patient by myself.  Impression and plan are mine.    LONG discussion re: nodules and steroids.    Documentation only provided per the nurse practitioner.    Additions and corrections are reflected in the signed note.    Salty Roberts MD,  M.D., F.C.C.P.  [facility]    [facility], [address] [place] [address], [address], [place] [address]  Office Visits:  [address] [place] [address]

## 2024-10-01 NOTE — ACP (ADVANCE CARE PLANNING)
Advance Care Planning   Healthcare Decision Maker:    Primary Decision Maker: ABBI ETIENNE - Spouse - [phone]    Primary Decision Maker: Gina Etienne - Child - [phone]    Click here to complete Healthcare Decision Makers including selection of the Healthcare Decision Maker Relationship (ie \"Primary\").

## 2024-10-01 NOTE — CARE COORDINATION
Met with patient about diagnosis and discharge plan of care. Pt admit for dysphagia, difficulty swallowing food/water. Pt stated that his cat jumped up on him to sleep and hit side of his neck. It was swollen. Pt NPO, iv fluids, iv decadron q8/hr. Pulmonary consult. Aerosol treatments. Pt does wear home o2 2.5-5L NC at home continuous through [facility]. Pt for barium swallow. Prior to admit, pt is independent. Lives in 3 story home, bed and 1/2 bath on 1st floor. Walk in shower in basement. Planning on putting in stair glide. Pt independent with therapies. PCP is Dr Rohan Price. Declining needs for home. Will follow-o

## 2024-10-01 NOTE — PROGRESS NOTES
Occupational Therapy    OCCUPATIONAL THERAPY INITIAL EVALUATION    [facility]   [address], [place]         Date:10/1/2024                                                  Patient Name: Mitchell Doyle    MRN: [id number]    : 1940    Room: [address] [place] [address]      Evaluating OT: Elida Brian OTR/L   [id number]      Referring Provider:Yariel Mckinney DO     Specific Provider Orders/Date:OT eval and treat       Diagnosis:  Dysphagia [R13.10]     Pertinent Medical History: COPD, prostate CA     Precautions:  Fall Risk, O2    COMMENTS:  eval only - patient able to manage own self care    Home Living: Pt lives with wife, 1 story with basement    Bathroom setup: shower , shower seat, hand held shower    Equipment owned: walker , home O2    Prior Level of Function: Independent with ADLs , assist as needed  with IADLs; ambulated with no device       Pain Level: no pain this session ;   Cognition: A&O: pleasant , following commands, conversing    Memory:  fair +    Sequencing:  fair+    Problem solving:  fair+    Judgement/safety:  fair +      Functional Assessment:  AM-PAC Daily Activity Raw Score: 23/24   Initial Eval Status  Date: 10/1/2024   Feeding Independent   Grooming Independent   UB Dressing Independent    LB Dressing Mod I   Patient able to lift and cross leg to reach feet to manage socks    Bathing LH bath sponge issued    Toileting Independent   Bed Mobility  Independent  Supine to sit    Functional Transfers Independent  Sit-stand from bed, chair    Functional Mobility Supervision, O2,   Ambulating in room    Balance Sitting:     Static:  Independent    Dynamic:Independent  Standing: Independent/supervison    Activity Tolerance No SOB observed this session   O2 on    Visual/  Perceptual Glasses: reading       UE ROM/strength  WFLs     Hand Dominance right    Hearing: WFL   Sensation:  No c/o numbness or tingling   Tone: WFL  Edema: none

## 2024-10-01 NOTE — PROGRESS NOTES
[facility]   Medical Staff Progress Note     Del Persaud  MEDICAL RECORD NUMBER:  [id number]  AGE: 82 y.o. GENDER: female  : 1929  EPISODE DATE:  2023    Chief complaint and reason for visit:   Left leg wound  Chief Complaint   Patient presents with    Wound Check     Left leg      Patient presenting for follow up evaluation of wound(s) per chief complaint. Subjective: Symptoms, wound related issues, or other pertinent wound history since last visit: Patient reports less pain, no drainage. Wound 23 Leg Left #1 (Active)   Wound Image   23 134   Wound Etiology Venous 23   Dressing Status Clean;Dry; Intact 23   Wound Cleansed Cleansed with saline 23   Dressing/Treatment Alginate with Ag;Gauze dressing/dressing sponge;Dry dressing;Tubular bandage 23 1418   Wound Length (cm) 0.6 cm 23 134   Wound Width (cm) 0.5 cm 23 134   Wound Depth (cm) 0.1 cm 23   Wound Surface Area (cm^2) 0.3 cm^2 23   Change in Wound Size % (l*w) 99.68 23 134   Wound Volume (cm^3) 0.03 cm^3 23   Wound Healing % 100 23   Post-Procedure Length (cm) 7 cm 23 135   Post-Procedure Width (cm) 6.5 cm 23 135   Post-Procedure Depth (cm) 0.1 cm 23 135   Post-Procedure Surface Area (cm^2) 45.5 cm^2 23 135   Post-Procedure Volume (cm^3) 4.55 cm^3 23 135   Wound Assessment Granulation tissue;Slough; Epithelialization 23 134   Drainage Amount Moderate (25-50%) 23 134   Drainage Description Serosanguinous 23 134   Odor None 23   Anamaria-wound Assessment Fragile 23   Margins Attached edges 23   Wound Thickness Description not for Pressure Injury Full thickness 23 5955   Number of days: 21          Procedures done during this encounter:   Np procedures    TIME: E/M Physical Therapy        PT orders received. Pt reports he is independent with mobility and does not need inpatient PT services. Will discharge order. Pt instructed to notify staff if PT needs arise.

## 2024-10-01 NOTE — TELEPHONE ENCOUNTER
Patient is currently admitted in the hospital.   The appeal did not get approved.  Once patient gets discharge and we do a hospital f/u Dr. Price will discuss next steps to take for this medication.

## 2024-10-01 NOTE — H&P
Acct:[id number]            Age/Sex: 84 / M                                                                Admit Date: 09/29/24            Loc: ER                                                                                     Attending Dr:     Ordering Physician: Rhonda Ramirez MD   Date of Service: 09/29/24  Procedure(s): XR esophagram  Accession Number(s): [id number]    cc: Rhonda Ramirez MD      INDICATIONS:  Dysphagia.  Patient feels like something is stuck in throat.     TECHNIQUE:  XR UGI Esophagram. Frontal and lateral chest and neck views (11 total  images).     COMPARISON:  CXR 9/29/2024.     FINDINGS:  AP and lateral radiographs of the upper chest were obtained after the oral  ingestion of contrast.  No fluoroscopic real-time esophogogram was performed.     Images demonstrate contrast within the lower half of the thoracic esophagus, with  the esophagus demonstrate normal diameter.  Contrast seems to pass rapidly to the  stomach, opacifying a normal diameter gastric lumen.  Lateral views demonstrate contrast within the pharynx, demonstrating progression of  contrast into the esophagus without delay.     IMPRESSION:  No obvious abnormal findings on spot radiographs of the chest during contrast  ingestion.  Contrast progressed to the gastric lumen without delay.  The esophagus  demonstrates normal diameter.        Signed by Jesse Michael MD  [address] [place] [address]  [phone]                Dictated By: Jesse Michael MD       DD/DT: 09/29/24 2332               Signed By: Jesse Michael MD 09/29/24 2332                : SHAYLA   09/29/24 2332    XR CHEST PORTABLE    Result Date: 9/29/2024                                      [facility]                                         [address] [place] [address]                                              [phone]                                 physical exam on the date of service with the patient. I have discussed the case with the resident. I also participated in medical decision making with the resident on the date of service and I agree with all of the pertinent clinical information unless indicated in my editing of the note. I have reviewed and edited the note above based on my findings during my history, exam, and decision making on the same day of service.     My additional thoughts:   He presented to the hospital with a chief complaint of difficulty swallowing  He has a history of COPD and A-fib  Apparently his cat jumped on his neck and after that he had trouble swallowing  He does have bruising on his neck  He is on aspirin for A-fib and not on anticoagulation  I discussed the case with his daughter who is a local physician over the phone yesterday  CT of the soft tissue of the neck is noted  IV Decadron is helping his swelling  ENT consultation pending  He swallowing better today  He wears 2.5 L/min nasal cannula oxygen at baseline  Continue bronchodilator medications as at home  Stop IV fluid hydration as he seems to be tolerating more liquids orally  Likely advance diet after modified barium swallow today    Electronically signed by Yariel Mckinney DO on 10/1/2024 at 11:52 AM    I can be reached through [employer].

## 2024-10-02 VITALS
HEART RATE: 105 BPM | HEIGHT: 69 IN | OXYGEN SATURATION: 100 % | DIASTOLIC BLOOD PRESSURE: 82 MMHG | WEIGHT: 156.4 LBS | TEMPERATURE: 97.6 F | BODY MASS INDEX: 23.16 KG/M2 | RESPIRATION RATE: 18 BRPM | SYSTOLIC BLOOD PRESSURE: 129 MMHG

## 2024-10-02 LAB
ALBUMIN SERPL-MCNC: 3.5 G/DL (ref 3.5–5.2)
ALP SERPL-CCNC: 85 U/L (ref 40–129)
ALT SERPL-CCNC: 19 U/L (ref 0–40)
ANION GAP SERPL CALCULATED.3IONS-SCNC: 5 MMOL/L (ref 7–16)
AST SERPL-CCNC: 15 U/L (ref 0–39)
BASOPHILS # BLD: 0 K/UL (ref 0–0.2)
BASOPHILS NFR BLD: 0 % (ref 0–2)
BILIRUB SERPL-MCNC: 0.8 MG/DL (ref 0–1.2)
BUN SERPL-MCNC: 33 MG/DL (ref 6–23)
CALCIUM SERPL-MCNC: 9.1 MG/DL (ref 8.6–10.2)
CHLORIDE SERPL-SCNC: 100 MMOL/L (ref 98–107)
CO2 SERPL-SCNC: 37 MMOL/L (ref 22–29)
CREAT SERPL-MCNC: 0.6 MG/DL (ref 0.7–1.2)
EOSINOPHIL # BLD: 0 K/UL (ref 0.05–0.5)
EOSINOPHILS RELATIVE PERCENT: 0 % (ref 0–6)
ERYTHROCYTE [DISTWIDTH] IN BLOOD BY AUTOMATED COUNT: 14.5 % (ref 11.5–15)
GFR, ESTIMATED: >90 ML/MIN/1.73M2
GLUCOSE SERPL-MCNC: 189 MG/DL (ref 74–99)
HCT VFR BLD AUTO: 42.3 % (ref 37–54)
HGB BLD-MCNC: 13.2 G/DL (ref 12.5–16.5)
LYMPHOCYTES NFR BLD: 0 K/UL (ref 1.5–4)
LYMPHOCYTES RELATIVE PERCENT: 0 % (ref 20–42)
MCH RBC QN AUTO: 30.3 PG (ref 26–35)
MCHC RBC AUTO-ENTMCNC: 31.2 G/DL (ref 32–34.5)
MCV RBC AUTO: 97.2 FL (ref 80–99.9)
MONOCYTES NFR BLD: 0.15 K/UL (ref 0.1–0.95)
MONOCYTES NFR BLD: 1 % (ref 2–12)
NEUTROPHILS NFR BLD: 99 % (ref 43–80)
NEUTS SEG NFR BLD: 16.85 K/UL (ref 1.8–7.3)
PLATELET # BLD AUTO: 284 K/UL (ref 130–450)
PMV BLD AUTO: 10.3 FL (ref 7–12)
POTASSIUM SERPL-SCNC: 4.2 MMOL/L (ref 3.5–5)
PROT SERPL-MCNC: 5.5 G/DL (ref 6.4–8.3)
RBC # BLD AUTO: 4.35 M/UL (ref 3.8–5.8)
RBC # BLD: ABNORMAL 10*6/UL
SODIUM SERPL-SCNC: 142 MMOL/L (ref 132–146)
WBC OTHER # BLD: 17 K/UL (ref 4.5–11.5)

## 2024-10-02 PROCEDURE — 2700000000 HC OXYGEN THERAPY PER DAY

## 2024-10-02 PROCEDURE — 6360000002 HC RX W HCPCS: Performed by: OTOLARYNGOLOGY

## 2024-10-02 PROCEDURE — 2580000003 HC RX 258: Performed by: INTERNAL MEDICINE

## 2024-10-02 PROCEDURE — 36415 COLL VENOUS BLD VENIPUNCTURE: CPT

## 2024-10-02 PROCEDURE — 85025 COMPLETE CBC W/AUTO DIFF WBC: CPT

## 2024-10-02 PROCEDURE — 6370000000 HC RX 637 (ALT 250 FOR IP): Performed by: INTERNAL MEDICINE

## 2024-10-02 PROCEDURE — 6360000002 HC RX W HCPCS: Performed by: INTERNAL MEDICINE

## 2024-10-02 PROCEDURE — 94640 AIRWAY INHALATION TREATMENT: CPT

## 2024-10-02 PROCEDURE — 80053 COMPREHEN METABOLIC PANEL: CPT

## 2024-10-02 RX ORDER — PREDNISONE 10 MG/1
10 TABLET ORAL 2 TIMES DAILY
Qty: 30 TABLET | Refills: 0 | Status: SHIPPED
Start: 2024-10-02

## 2024-10-02 RX ADMIN — DILTIAZEM HYDROCHLORIDE 240 MG: 240 CAPSULE, COATED, EXTENDED RELEASE ORAL at 09:48

## 2024-10-02 RX ADMIN — ARFORMOTEROL TARTRATE: 15 SOLUTION RESPIRATORY (INHALATION) at 08:12

## 2024-10-02 RX ADMIN — SODIUM CHLORIDE, PRESERVATIVE FREE 10 ML: 5 INJECTION INTRAVENOUS at 09:48

## 2024-10-02 RX ADMIN — DEXAMETHASONE SODIUM PHOSPHATE 10 MG: 10 INJECTION INTRAMUSCULAR; INTRAVENOUS at 01:04

## 2024-10-02 RX ADMIN — IPRATROPIUM BROMIDE 0.5 MG: 0.5 SOLUTION RESPIRATORY (INHALATION) at 08:12

## 2024-10-02 RX ADMIN — DILTIAZEM HYDROCHLORIDE 120 MG: 120 CAPSULE, COATED, EXTENDED RELEASE ORAL at 09:48

## 2024-10-02 NOTE — PROGRESS NOTES
Pulmonary Progress Note    Admit Date: 2024  Hospital day                               PCP: Rohan Price MD    Chief Complaint (s):  Patient Active Problem List   Diagnosis    Panlobular emphysema (HCC)    Elevated lipoprotein(a)    Prostate cancer (HCC)    COPD (chronic obstructive pulmonary disease) (HCC)    Venous insufficiency (chronic) (peripheral)    PAF (paroxysmal atrial fibrillation) (HCC)    Dysphagia    Hematoma of pharynx       Subjective:  Patient seen up in chair on 2.5 L nasal cannula. He feels overall improved. His breathing is better. He is swallowing better.       Vitals:  VITALS:  /82   Pulse (!) 105   Temp 97.6 °F (36.4 °C) (Oral)   Resp 18   Ht 1.753 m (5' 9\")   Wt 70.9 kg (156 lb 6.4 oz)   SpO2 100%   BMI 23.10 kg/m²     24HR INTAKE/OUTPUT:      Intake/Output Summary (Last 24 hours) at 10/2/2024 1116  Last data filed at 10/2/2024 0629  Gross per 24 hour   Intake --   Output 375 ml   Net -375 ml       24HR PULSE OXIMETRY RANGE:    SpO2  Av.5 %  Min: 99 %  Max: 100 %    Medications:  IV:   sodium chloride         Scheduled Meds:   sodium chloride flush  10 mL IntraVENous 2 times per day    arformoterol 15 mcg-budesonide 0.5 mg neb solution   Nebulization BID RT    ipratropium  0.5 mg Nebulization BID RT    dilTIAZem  120 mg Oral Daily    And    dilTIAZem  240 mg Oral Daily       Diet:   ADULT DIET; Regular     EXAM:  General: No distress. Alert.  Eyes: PERRL. No sclera icterus. No conjunctival injection.  ENT: No discharge. Pharynx clear.  Neck: Trachea midline. Normal thyroid.  Resp: No accessory muscle use. no rales. no wheezing. no rhonchi. Very diminished.   CV: Regular rate. Regular rhythm. No murmur or rub.   Abd: Non-tender. Non-distended. No masses. No organomegaly. Normal bowel sounds.   Skin: Warm and dry. No nodule on exposed extremities. No rash on exposed extremities.  Ext: No cyanosis, clubbing, edema  Lymph: No cervical LAD. No

## 2024-10-02 NOTE — DISCHARGE SUMMARY
nodule:  Pulm consult noted and appreciated  Continue IV Decadron, will need follow-up CT in 3 months for lung nodule     Afib:  Only on ASA as an OP-- on hold for now-restart eventually  Continue Cardizem      BRIEF PHYSICAL EXAMINATION AND LABORATORIES ON DAY OF DISCHARGE:  VITALS:  /82   Pulse (!) 105   Temp 97.6 °F (36.4 °C) (Oral)   Resp 18   Ht 1.753 m (5' 9\")   Wt 70.9 kg (156 lb 6.4 oz)   SpO2 100%   BMI 23.10 kg/m²     Please see note from the same day.     LABS::  Recent Labs     09/29/24  1653 10/01/24  0417 10/02/24  0415    142 142   K 4.2 4.1 4.2   CL 99 100 100   CO2 41* 35* 37*   BUN 26* 28* 33*   CREATININE 0.52* 0.5* 0.6*   GLUCOSE 133* 190* 189*   CALCIUM 9.2 8.6 9.1     Recent Labs     09/29/24  1653 10/01/24  0417 10/02/24  0415   ALKPHOS 80 86 85   ALT 23 21 19   AST 18 17 15   BILITOT 1.1 0.8 0.8     Recent Labs     09/29/24  1653 10/01/24  0417 10/02/24  0415   WBC 15.7* 8.7 17.0*   RBC 4.38* 4.33 4.35   HGB 13.2* 12.9 13.2   HCT 40.1* 41.9 42.3   MCV 91.5 96.8 97.2   MCH 30.1 29.8 30.3   MCHC 32.9* 30.8* 31.2*   RDW 15.6* 14.5 14.5    271 284   MPV 7.9 10.4 10.3     No results found for: \"LABA1C\"  No results found for: \"INR\", \"PROTIME\"   Lab Results   Component Value Date    TSH 1.39 04/23/2024    TSH 2.520 03/17/2023    TSH 2.740 03/11/2022     Lab Results   Component Value Date    TRIG 121 04/23/2024    TRIG 112 03/17/2023    TRIG 97 03/11/2022    HDL 85 04/23/2024    HDL 72 03/17/2023    HDL 71 03/11/2022     No results for input(s): \"MG\" in the last 72 hours.    No results for input(s): \"CKTOTAL\", \"CKMB\", \"TROPONINI\" in the last 72 hours.   No results for input(s): \"LACTA\" in the last 72 hours.    IMAGING:  FL MODIFIED BARIUM SWALLOW W VIDEO    Result Date: 10/1/2024  EXAMINATION: MODIFIED BARIUM SWALLOW WAS PERFORMED IN CONJUNCTION WITH SPEECH PATHOLOGY SERVICES TECHNIQUE: Under fluoroscopic evaluation cineradiography/videoradiography recordings were performed

## 2024-10-02 NOTE — PLAN OF CARE
Problem: Discharge Planning  Goal: Discharge to home or other facility with appropriate resources  10/2/2024 1408 by Amy Goodson RN  Outcome: Adequate for Discharge  10/2/2024 0200 by Memo Acosta, RN  Outcome: Progressing     Problem: ABCDS Injury Assessment  Goal: Absence of physical injury  10/2/2024 1408 by Amy Goodson RN  Outcome: Adequate for Discharge  10/2/2024 0200 by Memo Acosta, RN  Outcome: Progressing     Problem: Safety - Adult  Goal: Free from fall injury  10/2/2024 1408 by Amy Goodson RN  Outcome: Adequate for Discharge  10/2/2024 0200 by Memo Acosta, RN  Outcome: Progressing     Problem: Skin/Tissue Integrity  Goal: Absence of new skin breakdown  Description: 1.  Monitor for areas of redness and/or skin breakdown  2.  Assess vascular access sites hourly  3.  Every 4-6 hours minimum:  Change oxygen saturation probe site  4.  Every 4-6 hours:  If on nasal continuous positive airway pressure, respiratory therapy assess nares and determine need for appliance change or resting period.  10/2/2024 1408 by Amy Goodson, ISAURA  Outcome: Adequate for Discharge  10/2/2024 0200 by Memo Acosta, RN  Outcome: Progressing

## 2024-10-02 NOTE — PROGRESS NOTES
P Quality Flow/Interdisciplinary Rounds Progress Note        Quality Flow Rounds held on October 2, 2024    Disciplines Attending:  Bedside Nurse, , , and Nursing Unit Leadership    Mitchell Doyle was admitted on 9/30/2024  4:05 PM    Anticipated Discharge Date:       Disposition:    Dar Score:  Dar Scale Score: 20    Readmission Risk              Risk of Unplanned Readmission:  12           Discussed patient goal for the day, patient clinical progression, and barriers to discharge.  The following Goal(s) of the Day/Commitment(s) have been identified:   Discharge Planning      Diann Gerard RN  October 2, 2024

## 2024-10-02 NOTE — PROGRESS NOTES
sores  Skin: Ecchymosis noted to the left side of the neck consistent and mild ecchymosis to bilateral arms  Lungs: Diminished breath sounds on auscultation bilaterally, no retractions/use of accessory muscles, no vocal fremitus, no rhonchi, no crackle, no rales  Heart: regular rate, regular rhythm, no murmur  Abdomen: soft, NT, bowel sounds normal  Extremities: atraumatic, no edema  Neurologic: cranial nerves 2-12 grossly intact, no slurred speech    Most Recent Labs  Lab Results   Component Value Date    WBC 17.0 (H) 10/02/2024    HGB 13.2 10/02/2024    HCT 42.3 10/02/2024     10/02/2024     10/02/2024    K 4.2 10/02/2024     10/02/2024    CREATININE 0.6 (L) 10/02/2024    BUN 33 (H) 10/02/2024    CO2 37 (H) 10/02/2024    GLUCOSE 189 (H) 10/02/2024    ALT 19 10/02/2024    AST 15 10/02/2024    TSH 1.39 04/23/2024       FL MODIFIED BARIUM SWALLOW W VIDEO   Final Result   No evidence of aspiration or penetration.      Please see separate speech pathology report for full discussion of findings   and recommendations.         CT CHEST WO CONTRAST   Final Result   1. Subpleural right upper lobe nodule 0.7 cm.  Follow-up recommendations   above.   2. Emphysema, bronchitis.   3. Mild subacute appearing compression fractures at T5 through T7. Correlate   for focal pain and tenderness at this site.               Assessment   Active Hospital Problems    Diagnosis     Hematoma of pharynx [S10.0XXA]     Dysphagia [R13.10]     PAF (paroxysmal atrial fibrillation) ([facility]) [I48.0]     Venous insufficiency (chronic) (peripheral) [I87.2]     COPD (chronic obstructive pulmonary disease) ([facility]) [J44.9]     Panlobular emphysema (HCC) [J43.1]          Plan  Dysphagia related to hematoma from trauma:  CT neck noted  Hold ASA (h/o afib)  ENT consult noted-appreciate ENT consult  ENT follow-up in office in 1 week for reevaluation of throat  SLP eval noted and appreciated  MBS performed which showed functional oropharyngeal  swallow for age/premorbid functioning and esophageal motility deficits suspected.  They recommend regular consistency solids with thin liquids.  They recommend GI consultation in the outpatient for follow-up  IV Decadron-changed to oral on discharge  Patient eating and swallowing at this time without difficulty    Lung nodule:  Pulm consult noted and appreciated  Continue IV Decadron, will need follow-up CT in 3 months for lung nodule     Afib:  Only on ASA as an OP-- on hold for now-restart eventually  Continue Cardizem     PT/OT [facility]  OT - 23/24  PT -patient reported he is independent with mobility does not need inpatient PT services  Continue home medications other than as noted above.  Follow labs  DVT prophylaxis with SCD's  Please see orders for further management and care.   for discharge planning  Discharge plan: Home today    Electronically signed by Salty Kerr DO on 10/2/2024 at 8:48 AM    Addendum: I have personally participated in a face-to-face history and physical exam on the date of service with the patient. I have discussed the case with the resident. I also participated in medical decision making with the resident on the date of service and I agree with all of the pertinent clinical information unless indicated in my editing of the note. I have reviewed and edited the note above based on my findings during my history, exam, and decision making on the same day of service.     My additional thoughts:   He was seen and examined his room sitting up in a chair  He is tolerating his diet  He swallowing a lot better  He denies new complaints or issues  He will need his pulmonary nodule worked up as an outpatient  Wean steroids  Okay for discharge home today    Electronically signed by Yariel Mckinney DO on 10/2/2024 at 10:26 AM    I can be reached through [employer].

## 2024-10-02 NOTE — PLAN OF CARE
Problem: Discharge Planning  Goal: Discharge to home or other facility with appropriate resources  Outcome: Progressing     Problem: ABCDS Injury Assessment  Goal: Absence of physical injury  Outcome: Progressing     Problem: Safety - Adult  Goal: Free from fall injury  Outcome: Progressing     Problem: Skin/Tissue Integrity  Goal: Absence of new skin breakdown  Description: 1.  Monitor for areas of redness and/or skin breakdown  2.  Assess vascular access sites hourly  3.  Every 4-6 hours minimum:  Change oxygen saturation probe site  4.  Every 4-6 hours:  If on nasal continuous positive airway pressure, respiratory therapy assess nares and determine need for appliance change or resting period.  Outcome: Progressing

## 2024-10-03 ENCOUNTER — CARE COORDINATION (OUTPATIENT)
Dept: CARE COORDINATION | Age: 84
End: 2024-10-03

## 2024-10-03 NOTE — CARE COORDINATION
Care Transitions Note    Initial Call - Call within 2 business days of discharge: Yes    First attempt to reach the patient for initial Care Transition call post hospital discharge. HIPAA compliant message left with CTN's contact information requesting return phone call.      Outreach Attempts:   HIPAA compliant voicemail left for patient.     Received an incoming voicemail from Mitchell reporting he is \"doing good and swallowing ok.\"  Attempted to return patients call, voicemail left.     Patient: Mitchell Doyle    Patient : 1940   MRN: [id number]    Reason for Admission: dysphagia   Discharge Date: 10/2/24  RURS: Readmission Risk Score: 11.9    Last Discharge Facility       Date Complaint Diagnosis Description Type Department Provider    24   Admission (Discharged) Yariel Zhu,      Follow Up Appointment:   Patient does not have a follow up appointment scheduled at time of call.   Will route a message to Dr. Price's office requesting that an attempt be made to contact the patient to schedule TCM visit within 7 days of discharge, discharge date 10/2/24.    Future Appointments         Provider Specialty Dept Phone    2024 2:45 PM Salty Roberts MD Pulmonology [phone]          Sandra Carter, RN

## 2024-10-04 ENCOUNTER — CARE COORDINATION (OUTPATIENT)
Dept: CARE COORDINATION | Age: 84
End: 2024-10-04

## 2024-10-04 DIAGNOSIS — S10.0XXA: Primary | ICD-10-CM

## 2024-10-04 PROCEDURE — 1111F DSCHRG MED/CURRENT MED MERGE: CPT | Performed by: FAMILY MEDICINE

## 2024-10-04 NOTE — CARE COORDINATION
Care Transitions Note    Initial Call - Call within 2 business days of discharge: Yes    Patient Current Location:  Home: [address] [place] [id number]    Care Transition Nurse contacted the patient by telephone to perform post hospital discharge assessment, verified name and  as identifiers. Provided introduction to self, and explanation of the Care Transition Nurse role.     Patient: Mitchell Doyle    Patient : 1940   MRN: [id number]    Reason for Admission: dysphagia   Discharge Date: 10/2/24  RURS: Readmission Risk Score: 11.9      Last Discharge Facility       Date Complaint Diagnosis Description Type Department Provider    24   Admission (Discharged) [facility] Yariel Mckinney,      Additional needs identified to be addressed with provider   No needs identified           Method of communication with provider: none.    Care Summary Note:     Spoke with Mitchell for initial low readmission risk score care transition call post hospital discharge. He reports feeling \"pretty good\" today. He confirmed he is swallowing \"much better than before\" and is able to take his pills without issue. His only complaint today is that when he takes a sip of water it gets foamy in his mouth and he has to spit it out a few times. He was able to eat a ham sandwich yesterday without any swallowing issues other than he took his time, about 20 minutes to eat it. He plans to make hard boiled eggs today for the protein and ease of swallowing.   He is getting around with a walker today because he does feel a little bit weaker than normal from spending 9 days total in the hospital between [facility] and [facility].   Mitchell denies any needs, questions, or concerns at this time.     Care Transition Nurse reviewed discharge instructions, medical action plan, and red flags with patient. The patient was given an opportunity to ask questions; no further questions or concerns at this time.. The patient verbalized

## 2024-10-11 ENCOUNTER — TELEPHONE (OUTPATIENT)
Dept: ENT CLINIC | Age: 84
End: 2024-10-11

## 2024-10-11 ENCOUNTER — CARE COORDINATION (OUTPATIENT)
Dept: CARE COORDINATION | Age: 84
End: 2024-10-11

## 2024-10-11 NOTE — CARE COORDINATION
Care Transitions Note    Follow Up Call     Patient Current Location:  [place]    Care Transition Nurse contacted the spouse/partner , (PHI form verified; on file) by telephone. Verified name and  as identifiers.    Additional needs identified to be addressed with provider   No needs identified         Method of communication with provider: none.    Care Summary Note: Spoke with Mitchell's wife Isabelle for follow up care transition call. She reports that he drove himself the short distance into town. Isabelle reports that she feels he is continuing to improve, however, he still continues to have issues with water turning foamy in his mouth when taking a drink. He is swallowing pills and solids without any complaints or concerns, though she feels his appetite is decreased. Isabelle also said his voice sounds better and more clear.    Continues to wear 2.5L O2 at all times as prior to admission.   Isabelle confirmed he continues to use the walker most of the time but has done short trips in the house without it.     Their daughter did schedule his HFU appointments with his PCP and ENT for next week.   Isabelle denies any needs, questions, or concerns at this time.   Assessments:  Care Transitions Subsequent and Final Call    Subsequent and Final Calls  Do you have any ongoing symptoms?: Yes  Onset of Patient-reported symptoms: In the past 7 days  Patient-reported symptoms: Other  Have your medications changed?: No  Do you have any questions related to your medications?: No  Do you currently have any active services?: No  Do you have any needs or concerns that I can assist you with?: No  Identified Barriers: None  Care Transitions Interventions  No Identified Needs  Other Interventions:              Follow Up Appointment:   Reviewed upcoming appointment(s).  Future Appointments         Provider Specialty Dept Phone    10/15/2024 11:00 AM Rohan Price MD Family Medicine [phone]    10/17/2024 2:15 PM Alberto Malloy DO

## 2024-10-11 NOTE — TELEPHONE ENCOUNTER
Patients daughter calling to schedule with Dr Malloy for a hospital follow up. Please contact Gina Doyle to schedule. Thank you

## 2024-10-15 ENCOUNTER — OFFICE VISIT (OUTPATIENT)
Dept: FAMILY MEDICINE CLINIC | Age: 84
End: 2024-10-15

## 2024-10-15 VITALS
WEIGHT: 143.8 LBS | DIASTOLIC BLOOD PRESSURE: 78 MMHG | BODY MASS INDEX: 21.3 KG/M2 | SYSTOLIC BLOOD PRESSURE: 126 MMHG | RESPIRATION RATE: 18 BRPM | TEMPERATURE: 97.6 F | HEART RATE: 90 BPM | OXYGEN SATURATION: 93 % | HEIGHT: 69 IN

## 2024-10-15 DIAGNOSIS — Z09 HOSPITAL DISCHARGE FOLLOW-UP: ICD-10-CM

## 2024-10-15 DIAGNOSIS — J96.11 CHRONIC RESPIRATORY FAILURE WITH HYPOXIA: ICD-10-CM

## 2024-10-15 DIAGNOSIS — I27.81 COR PULMONALE (CHRONIC) (HCC): ICD-10-CM

## 2024-10-15 DIAGNOSIS — J43.1 PANLOBULAR EMPHYSEMA (HCC): Primary | ICD-10-CM

## 2024-10-15 RX ORDER — ASPIRIN 325 MG
325 TABLET ORAL DAILY
COMMUNITY

## 2024-10-15 ASSESSMENT — ENCOUNTER SYMPTOMS
ABDOMINAL PAIN: 0
DIARRHEA: 0
EYE REDNESS: 0
BACK PAIN: 0
CONSTIPATION: 0
SORE THROAT: 0
BLOOD IN STOOL: 0
SINUS PAIN: 0
PHOTOPHOBIA: 0
TROUBLE SWALLOWING: 1
COUGH: 1
COLOR CHANGE: 0
VOMITING: 0
SHORTNESS OF BREATH: 1
NAUSEA: 1
WHEEZING: 1

## 2024-10-15 NOTE — PROGRESS NOTES
3/1/2003  21.6    1 [birth year]/1/1973 3/1/2003 30.0      Smokeless Tobacco Use  Never              Alcohol History       Alcohol Use Status  Not Currently              Drug Use       Drug Use Status  Never              Sexual Activity       Sexually Active  Not Currently Partners  Female                    OBJECTIVE  Vitals:    10/15/24 1051   BP: 126/78   Pulse: 90   Resp: 18   Temp: 97.6 °F (36.4 °C)   TempSrc: Temporal   SpO2: 93%   Weight: 65.2 kg (143 lb 12.8 oz)   Height: 1.753 m (5' 9\")        Body mass index is 21.24 kg/m².    Orders Placed This Encounter   Procedures    Influenza, FLUAD Trivalent, (age 65 y+), IM, Preservative Free, 0.5mL    External Referral To Physical Medicine Rehab     Referral Priority:   Routine     Referral Type:   Eval and Treat     Referral Reason:   Specialty Services Required     Requested Specialty:   Physical Therapy     Number of Visits Requested:   1    Ambulatory Referral to Care Management     Referral Priority:   Routine     Referral Type:   Consult for Advice and Opinion     Number of Visits Requested:   1    [place] DISCHARGE MEDS RECONCILED W/ CURRENT OUTPATIENT MED LIST    DME Order for Hospital Bed as OP     You must complete the order parameters below and add the medical necessity documentation for this DME in a separate note.    [employer] hospital bed with mattress and any type rails    Current patient weight: Weight - Scale: 65.2 kg (143 lb 12.8 oz)  Current patient height: Height: 175.3 cm (5' 9\")  Diagnosis: COPD, chronic respiratory failure. Cor pulmonale  Length of need: Lifetime        EXAM   Physical Exam  Vitals and nursing note reviewed.   Constitutional:       Appearance: He is ill-appearing.      Comments: Somewhat cushingoid   HENT:      Right Ear: Tympanic membrane normal.      Left Ear: Tympanic membrane normal.      Ears:      Comments: Hearing aids     Nose: Congestion present.      Mouth/Throat:      Pharynx: Oropharynx is clear. No posterior oropharyngeal

## 2024-10-16 ENCOUNTER — TELEPHONE (OUTPATIENT)
Dept: FAMILY MEDICINE CLINIC | Age: 84
End: 2024-10-16

## 2024-10-17 ENCOUNTER — TELEPHONE (OUTPATIENT)
Dept: ENT CLINIC | Age: 84
End: 2024-10-17

## 2024-10-17 ENCOUNTER — OFFICE VISIT (OUTPATIENT)
Dept: ENT CLINIC | Age: 84
End: 2024-10-17

## 2024-10-17 VITALS
BODY MASS INDEX: 21.62 KG/M2 | HEIGHT: 69 IN | WEIGHT: 146 LBS | OXYGEN SATURATION: 89 % | DIASTOLIC BLOOD PRESSURE: 72 MMHG | TEMPERATURE: 97.7 F | HEART RATE: 98 BPM | SYSTOLIC BLOOD PRESSURE: 120 MMHG

## 2024-10-17 DIAGNOSIS — S10.0XXA: ICD-10-CM

## 2024-10-17 DIAGNOSIS — K21.9 GASTROESOPHAGEAL REFLUX DISEASE WITHOUT ESOPHAGITIS: ICD-10-CM

## 2024-10-17 DIAGNOSIS — R13.14 PHARYNGOESOPHAGEAL DYSPHAGIA: Primary | ICD-10-CM

## 2024-10-17 DIAGNOSIS — R09.A2 GLOBUS SENSATION: ICD-10-CM

## 2024-10-17 RX ORDER — OMEPRAZOLE 40 MG/1
40 CAPSULE, DELAYED RELEASE ORAL DAILY
Qty: 90 CAPSULE | Refills: 1 | Status: SHIPPED | OUTPATIENT
Start: 2024-10-17

## 2024-10-17 ASSESSMENT — ENCOUNTER SYMPTOMS
WHEEZING: 0
SINUS PAIN: 0
RHINORRHEA: 0
SHORTNESS OF BREATH: 0
COUGH: 0
TROUBLE SWALLOWING: 1
STRIDOR: 0
CHOKING: 0
SORE THROAT: 0
SINUS PRESSURE: 0

## 2024-10-17 NOTE — PROGRESS NOTES
Department of Otolaryngology  Office Consult Note  10/17/24          Subjective:        Chief Complaint:  had concerns including New Patient (NP ED F/U dysphagia/hearing).     Patient ID: Mitchell Doyle is a 84 y.o. male.    HPI: Patient presents as  follow-up for dysphagia.  Patient had left pharyngeal hematoma from trauma to the neck on scope exam contributing to dysphagia.  Since then he notes significant improvement in his throat pain and swallowing.  He still does have episodes of white foamy reflux into the mouth that is most bothersome.  He will have dysphagia intermittently mostly with liquids.     Review of Systems   Constitutional: Negative.    HENT:  Positive for trouble swallowing. Negative for congestion, ear discharge, ear pain, hearing loss, rhinorrhea, sinus pressure, sinus pain, sore throat and tinnitus.    Respiratory:  Negative for cough, choking, shortness of breath, wheezing and stridor.    Cardiovascular:  Negative for chest pain.   Skin:  Negative for rash.   Allergic/Immunologic: Negative for environmental allergies and immunocompromised state.   Neurological:  Negative for dizziness, weakness, light-headedness and headaches.   Psychiatric/Behavioral:  Negative for confusion.    All other systems reviewed and are negative.        Past Medical History:   Diagnosis Date    COPD (chronic obstructive pulmonary disease) (HCC)     Ischemic colitis (HCC) 2008    Prostate cancer (HCC)     x2 years      Past Surgical History:   Procedure Laterality Date    COLONOSCOPY  2009    Dr. Kilgore        Current Outpatient Medications:     omeprazole (PRILOSEC) 40 MG delayed release capsule, Take 1 capsule by mouth daily, Disp: 90 capsule, Rfl: 1    aspirin 325 MG tablet, Take 1 tablet by mouth daily, Disp: , Rfl:     predniSONE (DELTASONE) 10 MG tablet, Take 1 tablet by mouth 2 times daily Take 10 mg in morning and 5 mg at night time, Disp: 30 tablet, Rfl: 0    aclidinium (TUDORZA PRESSAIR) 400 MCG/ACT AEPB

## 2024-10-17 NOTE — TELEPHONE ENCOUNTER
Pt daughter, Dr. Gina Doyle with [facility], [facility] requesting a phone call prior to her father's appointment at 2:15 today.      Dr. Gina Doyle  [phone]

## 2024-10-21 ENCOUNTER — TELEPHONE (OUTPATIENT)
Dept: FAMILY MEDICINE CLINIC | Age: 84
End: 2024-10-21

## 2024-10-21 NOTE — TELEPHONE ENCOUNTER
I called and spoke with [facility] and they informed me that their new fax number isn't working and to send to their old one.   I asked [facility] if they sell hospital beds, but they are waiting on an order for them to come in. According to the lady I spoke with she stated that this could be in a few days. Sent order over with old fax number.     I called and spoke with petrona Fuentes in regards to this and I informed her they I can contact [facility] to see if they have any in. She gave the okay to do so.     I called and spoke with [facility] and they informed me that the do in fact have beds. I asked if we sent over and order today if it was possible to get a bed delivered to a patient tomorrow. They said yes.    I attempted to call Gina back, but no answer. I left a detailed message informing Gina what Vesna said, and that we will be send the order over to them. I informed her in the message if she did not want this to call and let us know.     All information needed was sent to [facility].

## 2024-10-21 NOTE — TELEPHONE ENCOUNTER
Carolee called w/ [facility] and requested to know the status of the hospital bed. States that patient is in dire need. They would like the bed delivered ASAP so they can keep him at home and avoid having to take him to the hospital.     Carolee states that family does not have a preference as to which DME company is used, and would gillian to go through the one that can deliver it the fastest.     DME order was placed when Mitchell was here in the office. Unsure if order has been faxed yet. Will route message to Debora to follow up on the request.     Carolee would like the office to call patient's daughter Gina with an update. Her number is [phone].

## 2024-10-22 NOTE — TELEPHONE ENCOUNTER
I called and spoke with wife Isabelle to see if they were taken care in regards to the bed.    She stated that [facility] delivered the bed today.

## 2024-10-23 ENCOUNTER — HOSPITAL ENCOUNTER (INPATIENT)
Age: 84
LOS: 2 days | Discharge: HOSPICE | DRG: 193 | End: 2024-10-25
Attending: STUDENT IN AN ORGANIZED HEALTH CARE EDUCATION/TRAINING PROGRAM | Admitting: INTERNAL MEDICINE
Payer: MEDICARE

## 2024-10-23 ENCOUNTER — APPOINTMENT (OUTPATIENT)
Dept: GENERAL RADIOLOGY | Age: 84
DRG: 193 | End: 2024-10-23
Payer: MEDICARE

## 2024-10-23 DIAGNOSIS — I48.91 ATRIAL FIBRILLATION WITH RAPID VENTRICULAR RESPONSE (HCC): ICD-10-CM

## 2024-10-23 DIAGNOSIS — J96.02 ACUTE RESPIRATORY FAILURE WITH HYPOXIA AND HYPERCAPNIA (HCC): Primary | ICD-10-CM

## 2024-10-23 DIAGNOSIS — Z71.89 DNR (DO NOT RESUSCITATE) DISCUSSION: ICD-10-CM

## 2024-10-23 DIAGNOSIS — J96.01 ACUTE RESPIRATORY FAILURE WITH HYPOXIA (HCC): ICD-10-CM

## 2024-10-23 DIAGNOSIS — J96.01 ACUTE RESPIRATORY FAILURE WITH HYPOXIA AND HYPERCAPNIA (HCC): Primary | ICD-10-CM

## 2024-10-23 LAB
ALBUMIN SERPL-MCNC: 2.9 G/DL (ref 3.5–5.2)
ALP SERPL-CCNC: 119 U/L (ref 40–129)
ALT SERPL-CCNC: 18 U/L (ref 0–40)
ANION GAP SERPL CALCULATED.3IONS-SCNC: 9 MMOL/L (ref 7–16)
AST SERPL-CCNC: 17 U/L (ref 0–39)
B PARAP IS1001 DNA NPH QL NAA+NON-PROBE: NOT DETECTED
B PERT DNA SPEC QL NAA+PROBE: NOT DETECTED
B.E.: 10.7 MMOL/L (ref -3–3)
B.E.: 11.7 MMOL/L (ref -3–3)
B.E.: 9.3 MMOL/L (ref -3–3)
B.E.: 9.6 MMOL/L (ref -3–3)
BASOPHILS # BLD: 0 K/UL (ref 0–0.2)
BASOPHILS NFR BLD: 0 % (ref 0–2)
BILIRUB SERPL-MCNC: 1.7 MG/DL (ref 0–1.2)
BNP SERPL-MCNC: 2429 PG/ML (ref 0–450)
BUN SERPL-MCNC: 31 MG/DL (ref 6–23)
C PNEUM DNA NPH QL NAA+NON-PROBE: NOT DETECTED
CALCIUM SERPL-MCNC: 9.1 MG/DL (ref 8.6–10.2)
CHLORIDE SERPL-SCNC: 93 MMOL/L (ref 98–107)
CO2 SERPL-SCNC: 38 MMOL/L (ref 22–29)
COHB: 0.4 % (ref 0–1.5)
COHB: 0.6 % (ref 0–1.5)
COHB: 0.8 % (ref 0–1.5)
COHB: 1.2 % (ref 0–1.5)
CREAT SERPL-MCNC: 0.5 MG/DL (ref 0.7–1.2)
CRITICAL: ABNORMAL
DATE ANALYZED: ABNORMAL
DATE OF COLLECTION: ABNORMAL
EOSINOPHIL # BLD: 0.1 K/UL (ref 0.05–0.5)
EOSINOPHILS RELATIVE PERCENT: 1 % (ref 0–6)
ERYTHROCYTE [DISTWIDTH] IN BLOOD BY AUTOMATED COUNT: 14.5 % (ref 11.5–15)
FIO2: 80 %
FIO2: 90 %
FLUAV RNA NPH QL NAA+NON-PROBE: NOT DETECTED
FLUBV RNA NPH QL NAA+NON-PROBE: NOT DETECTED
GFR, ESTIMATED: >90 ML/MIN/1.73M2
GLUCOSE BLD-MCNC: 130 MG/DL (ref 74–99)
GLUCOSE SERPL-MCNC: 134 MG/DL (ref 74–99)
HADV DNA NPH QL NAA+NON-PROBE: NOT DETECTED
HCO3: 38.5 MMOL/L (ref 22–26)
HCO3: 42 MMOL/L (ref 22–26)
HCO3: 42.5 MMOL/L (ref 22–26)
HCO3: 42.7 MMOL/L (ref 22–26)
HCOV 229E RNA NPH QL NAA+NON-PROBE: NOT DETECTED
HCOV HKU1 RNA NPH QL NAA+NON-PROBE: NOT DETECTED
HCOV NL63 RNA NPH QL NAA+NON-PROBE: NOT DETECTED
HCOV OC43 RNA NPH QL NAA+NON-PROBE: NOT DETECTED
HCT VFR BLD AUTO: 44.7 % (ref 37–54)
HGB BLD-MCNC: 13.7 G/DL (ref 12.5–16.5)
HHB: 0.8 % (ref 0–5)
HHB: 1.9 % (ref 0–5)
HHB: 4.6 % (ref 0–5)
HHB: 9.3 % (ref 0–5)
HMPV RNA NPH QL NAA+NON-PROBE: NOT DETECTED
HPIV1 RNA NPH QL NAA+NON-PROBE: NOT DETECTED
HPIV2 RNA NPH QL NAA+NON-PROBE: NOT DETECTED
HPIV3 RNA NPH QL NAA+NON-PROBE: NOT DETECTED
HPIV4 RNA NPH QL NAA+NON-PROBE: NOT DETECTED
L PNEUMO1 AG UR QL IA.RAPID: NEGATIVE
LAB: ABNORMAL
LYMPHOCYTES NFR BLD: 0.3 K/UL (ref 1.5–4)
LYMPHOCYTES RELATIVE PERCENT: 3 % (ref 20–42)
Lab: 1012
Lab: 1226
Lab: 1742
Lab: 455
M PNEUMO DNA NPH QL NAA+NON-PROBE: NOT DETECTED
MCH RBC QN AUTO: 30 PG (ref 26–35)
MCHC RBC AUTO-ENTMCNC: 30.6 G/DL (ref 32–34.5)
MCV RBC AUTO: 98 FL (ref 80–99.9)
METAMYELOCYTES ABSOLUTE COUNT: 0.2 K/UL (ref 0–0.12)
METAMYELOCYTES: 2 % (ref 0–1)
METHB: 0.3 % (ref 0–1.5)
MODE: ABNORMAL
MONOCYTES NFR BLD: 0.9 K/UL (ref 0.1–0.95)
MONOCYTES NFR BLD: 8 % (ref 2–12)
MYELOCYTES ABSOLUTE COUNT: 0.1 K/UL
MYELOCYTES: 1 %
NEUTROPHILS NFR BLD: 85 % (ref 43–80)
NEUTS SEG NFR BLD: 9.8 K/UL (ref 1.8–7.3)
O2 CONTENT: 17.3 ML/DL
O2 CONTENT: 17.5 ML/DL
O2 CONTENT: 18.8 ML/DL
O2 CONTENT: 19.5 ML/DL
O2 SATURATION: 90.6 % (ref 92–98.5)
O2 SATURATION: 95.3 % (ref 92–98.5)
O2 SATURATION: 98.1 % (ref 92–98.5)
O2 SATURATION: 99.2 % (ref 92–98.5)
O2HB: 89.6 % (ref 94–97)
O2HB: 93.9 % (ref 94–97)
O2HB: 97.4 % (ref 94–97)
O2HB: 98.3 % (ref 94–97)
OPERATOR ID: 2962
OPERATOR ID: 3114
OPERATOR ID: 405
OPERATOR ID: 420
PATIENT TEMP: 37 C
PCO2: 107.3 MMHG (ref 35–45)
PCO2: 108.5 MMHG (ref 35–45)
PCO2: 76.4 MMHG (ref 35–45)
PCO2: 92.1 MMHG (ref 35–45)
PEEP/CPAP: 6 CMH2O
PEEP/CPAP: 6 CMH2O
PFO2: 1.65 MMHG/%
PFO2: 2.63 MMHG/%
PH BLOOD GAS: 7.21 (ref 7.35–7.45)
PH BLOOD GAS: 7.22 (ref 7.35–7.45)
PH BLOOD GAS: 7.28 (ref 7.35–7.45)
PH BLOOD GAS: 7.32 (ref 7.35–7.45)
PLATELET # BLD AUTO: 250 K/UL (ref 130–450)
PMV BLD AUTO: 9.8 FL (ref 7–12)
PO2: 132.1 MMHG (ref 75–100)
PO2: 236.7 MMHG (ref 75–100)
PO2: 68.9 MMHG (ref 75–100)
PO2: 94.1 MMHG (ref 75–100)
POTASSIUM SERPL-SCNC: 4.7 MMOL/L (ref 3.5–5)
PROCALCITONIN SERPL-MCNC: 0.21 NG/ML (ref 0–0.08)
PROMYELOCYTES ABSOLUTE COUNT: 0.1 K/UL
PROMYELOCYTES: 1 %
PROT SERPL-MCNC: 5.6 G/DL (ref 6.4–8.3)
RBC # BLD AUTO: 4.56 M/UL (ref 3.8–5.8)
RBC # BLD: ABNORMAL 10*6/UL
RI(T): 1.31
RI(T): 2.71
RR MECHANICAL: 20 B/MIN
RR MECHANICAL: 20 B/MIN
RSV RNA NPH QL NAA+NON-PROBE: NOT DETECTED
RV+EV RNA NPH QL NAA+NON-PROBE: NOT DETECTED
S PNEUM AG SPEC QL: NEGATIVE
SARS-COV-2 RNA NPH QL NAA+NON-PROBE: NOT DETECTED
SODIUM SERPL-SCNC: 140 MMOL/L (ref 132–146)
SOURCE, BLOOD GAS: ABNORMAL
SPECIMEN DESCRIPTION: NORMAL
SPECIMEN SOURCE: NORMAL
THB: 13.2 G/DL (ref 11.5–16.5)
THB: 13.6 G/DL (ref 11.5–16.5)
THB: 13.7 G/DL (ref 11.5–16.5)
THB: 13.7 G/DL (ref 11.5–16.5)
TIME ANALYZED: 1022
TIME ANALYZED: 1231
TIME ANALYZED: 1749
TIME ANALYZED: 503
TROPONIN I SERPL HS-MCNC: 32 NG/L (ref 0–11)
VT MECHANICAL: 450 ML
VT MECHANICAL: 450 ML
WBC OTHER # BLD: 11.5 K/UL (ref 4.5–11.5)

## 2024-10-23 PROCEDURE — 96375 TX/PRO/DX INJ NEW DRUG ADDON: CPT

## 2024-10-23 PROCEDURE — 82962 GLUCOSE BLOOD TEST: CPT

## 2024-10-23 PROCEDURE — 96374 THER/PROPH/DIAG INJ IV PUSH: CPT

## 2024-10-23 PROCEDURE — 96372 THER/PROPH/DIAG INJ SC/IM: CPT

## 2024-10-23 PROCEDURE — 87899 AGENT NOS ASSAY W/OPTIC: CPT

## 2024-10-23 PROCEDURE — 96366 THER/PROPH/DIAG IV INF ADDON: CPT

## 2024-10-23 PROCEDURE — 6360000002 HC RX W HCPCS: Performed by: NURSE PRACTITIONER

## 2024-10-23 PROCEDURE — 96376 TX/PRO/DX INJ SAME DRUG ADON: CPT

## 2024-10-23 PROCEDURE — 80053 COMPREHEN METABOLIC PANEL: CPT

## 2024-10-23 PROCEDURE — 0202U NFCT DS 22 TRGT SARS-COV-2: CPT

## 2024-10-23 PROCEDURE — 2060000000 HC ICU INTERMEDIATE R&B

## 2024-10-23 PROCEDURE — 87449 NOS EACH ORGANISM AG IA: CPT

## 2024-10-23 PROCEDURE — 6360000002 HC RX W HCPCS: Performed by: STUDENT IN AN ORGANIZED HEALTH CARE EDUCATION/TRAINING PROGRAM

## 2024-10-23 PROCEDURE — 5A09357 ASSISTANCE WITH RESPIRATORY VENTILATION, LESS THAN 24 CONSECUTIVE HOURS, CONTINUOUS POSITIVE AIRWAY PRESSURE: ICD-10-PCS | Performed by: INTERNAL MEDICINE

## 2024-10-23 PROCEDURE — 94640 AIRWAY INHALATION TREATMENT: CPT

## 2024-10-23 PROCEDURE — 36600 WITHDRAWAL OF ARTERIAL BLOOD: CPT

## 2024-10-23 PROCEDURE — 84484 ASSAY OF TROPONIN QUANT: CPT

## 2024-10-23 PROCEDURE — 99285 EMERGENCY DEPT VISIT HI MDM: CPT

## 2024-10-23 PROCEDURE — 83880 ASSAY OF NATRIURETIC PEPTIDE: CPT

## 2024-10-23 PROCEDURE — 2580000003 HC RX 258: Performed by: INTERNAL MEDICINE

## 2024-10-23 PROCEDURE — 2500000003 HC RX 250 WO HCPCS: Performed by: INTERNAL MEDICINE

## 2024-10-23 PROCEDURE — 93005 ELECTROCARDIOGRAM TRACING: CPT | Performed by: STUDENT IN AN ORGANIZED HEALTH CARE EDUCATION/TRAINING PROGRAM

## 2024-10-23 PROCEDURE — 71045 X-RAY EXAM CHEST 1 VIEW: CPT

## 2024-10-23 PROCEDURE — 99222 1ST HOSP IP/OBS MODERATE 55: CPT | Performed by: NURSE PRACTITIONER

## 2024-10-23 PROCEDURE — 6370000000 HC RX 637 (ALT 250 FOR IP): Performed by: INTERNAL MEDICINE

## 2024-10-23 PROCEDURE — 94660 CPAP INITIATION&MGMT: CPT

## 2024-10-23 PROCEDURE — 96361 HYDRATE IV INFUSION ADD-ON: CPT

## 2024-10-23 PROCEDURE — 2580000003 HC RX 258: Performed by: STUDENT IN AN ORGANIZED HEALTH CARE EDUCATION/TRAINING PROGRAM

## 2024-10-23 PROCEDURE — 6360000002 HC RX W HCPCS: Performed by: INTERNAL MEDICINE

## 2024-10-23 PROCEDURE — 6370000000 HC RX 637 (ALT 250 FOR IP): Performed by: STUDENT IN AN ORGANIZED HEALTH CARE EDUCATION/TRAINING PROGRAM

## 2024-10-23 PROCEDURE — 96365 THER/PROPH/DIAG IV INF INIT: CPT

## 2024-10-23 PROCEDURE — 82805 BLOOD GASES W/O2 SATURATION: CPT

## 2024-10-23 PROCEDURE — 84145 PROCALCITONIN (PCT): CPT

## 2024-10-23 PROCEDURE — 85025 COMPLETE CBC W/AUTO DIFF WBC: CPT

## 2024-10-23 PROCEDURE — 2500000003 HC RX 250 WO HCPCS: Performed by: STUDENT IN AN ORGANIZED HEALTH CARE EDUCATION/TRAINING PROGRAM

## 2024-10-23 RX ORDER — BUMETANIDE 0.25 MG/ML
1 INJECTION, SOLUTION INTRAMUSCULAR; INTRAVENOUS 2 TIMES DAILY
Status: DISCONTINUED | OUTPATIENT
Start: 2024-10-23 | End: 2024-10-25 | Stop reason: HOSPADM

## 2024-10-23 RX ORDER — DILTIAZEM HYDROCHLORIDE 180 MG/1
360 CAPSULE, COATED, EXTENDED RELEASE ORAL DAILY
Status: DISCONTINUED | OUTPATIENT
Start: 2024-10-23 | End: 2024-10-25 | Stop reason: HOSPADM

## 2024-10-23 RX ORDER — FUROSEMIDE 20 MG/1
20 TABLET ORAL DAILY PRN
COMMUNITY

## 2024-10-23 RX ORDER — SODIUM CHLORIDE 0.9 % (FLUSH) 0.9 %
10 SYRINGE (ML) INJECTION EVERY 12 HOURS SCHEDULED
Status: DISCONTINUED | OUTPATIENT
Start: 2024-10-23 | End: 2024-10-25 | Stop reason: HOSPADM

## 2024-10-23 RX ORDER — 0.9 % SODIUM CHLORIDE 0.9 %
500 INTRAVENOUS SOLUTION INTRAVENOUS ONCE
Status: COMPLETED | OUTPATIENT
Start: 2024-10-23 | End: 2024-10-23

## 2024-10-23 RX ORDER — IPRATROPIUM BROMIDE AND ALBUTEROL SULFATE 2.5; .5 MG/3ML; MG/3ML
3 SOLUTION RESPIRATORY (INHALATION) ONCE
Status: COMPLETED | OUTPATIENT
Start: 2024-10-23 | End: 2024-10-23

## 2024-10-23 RX ORDER — SENNOSIDES 8.6 MG/1
1 TABLET ORAL DAILY PRN
Status: DISCONTINUED | OUTPATIENT
Start: 2024-10-23 | End: 2024-10-25 | Stop reason: HOSPADM

## 2024-10-23 RX ORDER — BUDESONIDE 0.5 MG/2ML
0.5 INHALANT ORAL
Status: DISCONTINUED | OUTPATIENT
Start: 2024-10-23 | End: 2024-10-25 | Stop reason: HOSPADM

## 2024-10-23 RX ORDER — ONDANSETRON 2 MG/ML
4 INJECTION INTRAMUSCULAR; INTRAVENOUS EVERY 6 HOURS PRN
Status: DISCONTINUED | OUTPATIENT
Start: 2024-10-23 | End: 2024-10-23

## 2024-10-23 RX ORDER — ACETAMINOPHEN 650 MG/1
650 SUPPOSITORY RECTAL EVERY 6 HOURS PRN
Status: DISCONTINUED | OUTPATIENT
Start: 2024-10-23 | End: 2024-10-25 | Stop reason: HOSPADM

## 2024-10-23 RX ORDER — SODIUM CHLORIDE 9 MG/ML
INJECTION, SOLUTION INTRAVENOUS PRN
Status: DISCONTINUED | OUTPATIENT
Start: 2024-10-23 | End: 2024-10-25 | Stop reason: HOSPADM

## 2024-10-23 RX ORDER — METHYLPREDNISOLONE SODIUM SUCCINATE 40 MG/ML
40 INJECTION INTRAMUSCULAR; INTRAVENOUS EVERY 8 HOURS
Status: DISCONTINUED | OUTPATIENT
Start: 2024-10-23 | End: 2024-10-24

## 2024-10-23 RX ORDER — METHYLPREDNISOLONE SODIUM SUCCINATE 125 MG/2ML
125 INJECTION INTRAMUSCULAR; INTRAVENOUS ONCE
Status: COMPLETED | OUTPATIENT
Start: 2024-10-23 | End: 2024-10-23

## 2024-10-23 RX ORDER — DILTIAZEM HYDROCHLORIDE EXTENDED-RELEASE TABLETS 120 MG/1
120 TABLET, EXTENDED RELEASE ORAL DAILY
Status: DISCONTINUED | OUTPATIENT
Start: 2024-10-23 | End: 2024-10-23

## 2024-10-23 RX ORDER — PROCHLORPERAZINE MALEATE 10 MG
10 TABLET ORAL EVERY 8 HOURS PRN
Status: DISCONTINUED | OUTPATIENT
Start: 2024-10-23 | End: 2024-10-25 | Stop reason: HOSPADM

## 2024-10-23 RX ORDER — DILTIAZEM HCL/D5W 125 MG/125
2.5-15 PLASTIC BAG, INJECTION (ML) INTRAVENOUS CONTINUOUS
Status: DISCONTINUED | OUTPATIENT
Start: 2024-10-23 | End: 2024-10-23 | Stop reason: CLARIF

## 2024-10-23 RX ORDER — MAGNESIUM SULFATE IN WATER 40 MG/ML
2000 INJECTION, SOLUTION INTRAVENOUS PRN
Status: DISCONTINUED | OUTPATIENT
Start: 2024-10-23 | End: 2024-10-25 | Stop reason: HOSPADM

## 2024-10-23 RX ORDER — SODIUM CHLORIDE 0.9 % (FLUSH) 0.9 %
10 SYRINGE (ML) INJECTION PRN
Status: DISCONTINUED | OUTPATIENT
Start: 2024-10-23 | End: 2024-10-25 | Stop reason: HOSPADM

## 2024-10-23 RX ORDER — CEFEPIME HYDROCHLORIDE 2 G/1
2 INJECTION, POWDER, FOR SOLUTION INTRAVENOUS EVERY 12 HOURS
Status: DISCONTINUED | OUTPATIENT
Start: 2024-10-23 | End: 2024-10-25 | Stop reason: HOSPADM

## 2024-10-23 RX ORDER — ONDANSETRON 4 MG/1
4 TABLET, ORALLY DISINTEGRATING ORAL EVERY 8 HOURS PRN
Status: DISCONTINUED | OUTPATIENT
Start: 2024-10-23 | End: 2024-10-23

## 2024-10-23 RX ORDER — PREDNISONE 20 MG/1
20 TABLET ORAL EVERY MORNING
COMMUNITY

## 2024-10-23 RX ORDER — ENOXAPARIN SODIUM 100 MG/ML
40 INJECTION SUBCUTANEOUS DAILY
Status: DISCONTINUED | OUTPATIENT
Start: 2024-10-23 | End: 2024-10-25 | Stop reason: HOSPADM

## 2024-10-23 RX ORDER — IPRATROPIUM BROMIDE AND ALBUTEROL SULFATE 2.5; .5 MG/3ML; MG/3ML
1 SOLUTION RESPIRATORY (INHALATION)
Status: DISCONTINUED | OUTPATIENT
Start: 2024-10-23 | End: 2024-10-25

## 2024-10-23 RX ORDER — WATER 10 ML/10ML
20 INJECTION INTRAMUSCULAR; INTRAVENOUS; SUBCUTANEOUS EVERY 12 HOURS
Status: DISCONTINUED | OUTPATIENT
Start: 2024-10-23 | End: 2024-10-25 | Stop reason: HOSPADM

## 2024-10-23 RX ORDER — PREDNISONE 10 MG/1
15 TABLET ORAL NIGHTLY
Status: ON HOLD | COMMUNITY
End: 2024-10-25 | Stop reason: HOSPADM

## 2024-10-23 RX ORDER — PANTOPRAZOLE SODIUM 40 MG/1
40 TABLET, DELAYED RELEASE ORAL
Status: DISCONTINUED | OUTPATIENT
Start: 2024-10-23 | End: 2024-10-25 | Stop reason: HOSPADM

## 2024-10-23 RX ORDER — ACETAMINOPHEN 325 MG/1
650 TABLET ORAL EVERY 6 HOURS PRN
Status: DISCONTINUED | OUTPATIENT
Start: 2024-10-23 | End: 2024-10-25 | Stop reason: HOSPADM

## 2024-10-23 RX ORDER — OMEPRAZOLE 40 MG/1
40 CAPSULE, DELAYED RELEASE ORAL EVERY MORNING
Status: ON HOLD | COMMUNITY
End: 2024-10-25 | Stop reason: HOSPADM

## 2024-10-23 RX ORDER — DILTIAZEM HYDROCHLORIDE EXTENDED-RELEASE TABLETS 240 MG/1
240 TABLET, EXTENDED RELEASE ORAL DAILY
Status: DISCONTINUED | OUTPATIENT
Start: 2024-10-23 | End: 2024-10-23

## 2024-10-23 RX ORDER — MORPHINE SULFATE 2 MG/ML
1 INJECTION, SOLUTION INTRAMUSCULAR; INTRAVENOUS
Status: DISCONTINUED | OUTPATIENT
Start: 2024-10-23 | End: 2024-10-25 | Stop reason: HOSPADM

## 2024-10-23 RX ORDER — TRIAMCINOLONE ACETONIDE 1 MG/G
CREAM TOPICAL 2 TIMES DAILY PRN
Status: ON HOLD | COMMUNITY
End: 2024-10-25 | Stop reason: HOSPADM

## 2024-10-23 RX ORDER — LORAZEPAM 2 MG/ML
0.5 INJECTION INTRAMUSCULAR ONCE
Status: COMPLETED | OUTPATIENT
Start: 2024-10-23 | End: 2024-10-23

## 2024-10-23 RX ORDER — PROCHLORPERAZINE EDISYLATE 5 MG/ML
10 INJECTION INTRAMUSCULAR; INTRAVENOUS EVERY 6 HOURS PRN
Status: DISCONTINUED | OUTPATIENT
Start: 2024-10-23 | End: 2024-10-25 | Stop reason: HOSPADM

## 2024-10-23 RX ORDER — ASPIRIN 325 MG
325 TABLET ORAL DAILY
Status: DISCONTINUED | OUTPATIENT
Start: 2024-10-23 | End: 2024-10-25 | Stop reason: HOSPADM

## 2024-10-23 RX ORDER — ARFORMOTEROL TARTRATE 15 UG/2ML
15 SOLUTION RESPIRATORY (INHALATION)
Status: DISCONTINUED | OUTPATIENT
Start: 2024-10-23 | End: 2024-10-25 | Stop reason: HOSPADM

## 2024-10-23 RX ORDER — POTASSIUM CHLORIDE 7.45 MG/ML
10 INJECTION INTRAVENOUS PRN
Status: DISCONTINUED | OUTPATIENT
Start: 2024-10-23 | End: 2024-10-25 | Stop reason: HOSPADM

## 2024-10-23 RX ORDER — POTASSIUM CHLORIDE 1500 MG/1
40 TABLET, EXTENDED RELEASE ORAL PRN
Status: DISCONTINUED | OUTPATIENT
Start: 2024-10-23 | End: 2024-10-25 | Stop reason: HOSPADM

## 2024-10-23 RX ORDER — DILTIAZEM HYDROCHLORIDE 5 MG/ML
10 INJECTION INTRAVENOUS ONCE
Status: COMPLETED | OUTPATIENT
Start: 2024-10-23 | End: 2024-10-23

## 2024-10-23 RX ADMIN — ARFORMOTEROL TARTRATE 15 MCG: 15 SOLUTION RESPIRATORY (INHALATION) at 10:18

## 2024-10-23 RX ADMIN — DOXYCYCLINE 100 MG: 100 INJECTION, POWDER, LYOPHILIZED, FOR SOLUTION INTRAVENOUS at 21:19

## 2024-10-23 RX ADMIN — SODIUM CHLORIDE 500 ML: 9 INJECTION, SOLUTION INTRAVENOUS at 07:57

## 2024-10-23 RX ADMIN — METHYLPREDNISOLONE SODIUM SUCCINATE 40 MG: 40 INJECTION INTRAMUSCULAR; INTRAVENOUS at 14:29

## 2024-10-23 RX ADMIN — MORPHINE SULFATE 1 MG: 2 INJECTION, SOLUTION INTRAMUSCULAR; INTRAVENOUS at 23:45

## 2024-10-23 RX ADMIN — BUMETANIDE 1 MG: 0.25 INJECTION INTRAMUSCULAR; INTRAVENOUS at 11:39

## 2024-10-23 RX ADMIN — CEFTRIAXONE 1000 MG: 1 INJECTION, POWDER, FOR SOLUTION INTRAMUSCULAR; INTRAVENOUS at 07:53

## 2024-10-23 RX ADMIN — WATER 20 ML: 1 INJECTION INTRAMUSCULAR; INTRAVENOUS; SUBCUTANEOUS at 21:10

## 2024-10-23 RX ADMIN — BUMETANIDE 1 MG: 0.25 INJECTION INTRAMUSCULAR; INTRAVENOUS at 17:56

## 2024-10-23 RX ADMIN — LORAZEPAM 0.5 MG: 2 INJECTION INTRAMUSCULAR; INTRAVENOUS at 07:24

## 2024-10-23 RX ADMIN — METHYLPREDNISOLONE SODIUM SUCCINATE 40 MG: 40 INJECTION INTRAMUSCULAR; INTRAVENOUS at 21:11

## 2024-10-23 RX ADMIN — CEFEPIME 2 G: 2 INJECTION, POWDER, FOR SOLUTION INTRAVENOUS at 11:38

## 2024-10-23 RX ADMIN — IPRATROPIUM BROMIDE AND ALBUTEROL SULFATE 1 DOSE: 2.5; .5 SOLUTION RESPIRATORY (INHALATION) at 10:18

## 2024-10-23 RX ADMIN — IPRATROPIUM BROMIDE AND ALBUTEROL SULFATE 3 DOSE: .5; 3 SOLUTION RESPIRATORY (INHALATION) at 05:00

## 2024-10-23 RX ADMIN — SODIUM CHLORIDE, PRESERVATIVE FREE 10 ML: 5 INJECTION INTRAVENOUS at 11:39

## 2024-10-23 RX ADMIN — BUDESONIDE 500 MCG: 0.5 SUSPENSION RESPIRATORY (INHALATION) at 10:18

## 2024-10-23 RX ADMIN — SODIUM CHLORIDE 10 MG/HR: 900 INJECTION, SOLUTION INTRAVENOUS at 16:02

## 2024-10-23 RX ADMIN — SODIUM CHLORIDE, PRESERVATIVE FREE 10 ML: 5 INJECTION INTRAVENOUS at 21:11

## 2024-10-23 RX ADMIN — MORPHINE SULFATE 1 MG: 2 INJECTION, SOLUTION INTRAMUSCULAR; INTRAVENOUS at 19:46

## 2024-10-23 RX ADMIN — IPRATROPIUM BROMIDE AND ALBUTEROL SULFATE 1 DOSE: 2.5; .5 SOLUTION RESPIRATORY (INHALATION) at 13:28

## 2024-10-23 RX ADMIN — DILTIAZEM HYDROCHLORIDE 10 MG: 5 INJECTION, SOLUTION INTRAVENOUS at 16:02

## 2024-10-23 RX ADMIN — DOXYCYCLINE 100 MG: 100 INJECTION, POWDER, LYOPHILIZED, FOR SOLUTION INTRAVENOUS at 07:53

## 2024-10-23 RX ADMIN — WATER 20 ML: 1 INJECTION INTRAMUSCULAR; INTRAVENOUS; SUBCUTANEOUS at 11:38

## 2024-10-23 RX ADMIN — METHYLPREDNISOLONE SODIUM SUCCINATE 125 MG: 125 INJECTION, POWDER, LYOPHILIZED, FOR SOLUTION INTRAMUSCULAR; INTRAVENOUS at 05:07

## 2024-10-23 RX ADMIN — IPRATROPIUM BROMIDE AND ALBUTEROL SULFATE 1 DOSE: 2.5; .5 SOLUTION RESPIRATORY (INHALATION) at 20:21

## 2024-10-23 RX ADMIN — MORPHINE SULFATE 1 MG: 2 INJECTION, SOLUTION INTRAMUSCULAR; INTRAVENOUS at 14:29

## 2024-10-23 RX ADMIN — ARFORMOTEROL TARTRATE 15 MCG: 15 SOLUTION RESPIRATORY (INHALATION) at 20:21

## 2024-10-23 RX ADMIN — IPRATROPIUM BROMIDE AND ALBUTEROL SULFATE 1 DOSE: 2.5; .5 SOLUTION RESPIRATORY (INHALATION) at 16:05

## 2024-10-23 RX ADMIN — CEFEPIME 2 G: 2 INJECTION, POWDER, FOR SOLUTION INTRAVENOUS at 21:10

## 2024-10-23 RX ADMIN — ENOXAPARIN SODIUM 40 MG: 100 INJECTION SUBCUTANEOUS at 11:39

## 2024-10-23 RX ADMIN — BUDESONIDE 500 MCG: 0.5 SUSPENSION RESPIRATORY (INHALATION) at 20:21

## 2024-10-23 NOTE — ED NOTES
ED to Inpatient Handoff Report    Notified 4w that electronic handoff available and patient ready for transport to room 401.    Safety Risks: Difficulty with daily activities, Risk of falls, and on bipap may benefit from sitter    Patient in Restraints: no    Constant Observer or Patient : no    Telemetry Monitoring Ordered :Yes      Cardiac Rhythm: Atrial fib    Order to transfer to unit without monitor:YES    Last MEWS: 1 Time completed: 0937    Deterioration Index Score:   Predictive Model Details          70 (Warning)  Factor Value    Calculated 10/23/2024 09:41 22% Wister coma scale 12    Deterioration Index Model 18% Age 84 years old     16% Supplemental oxygen PAP (positive airway pressure)     10% Blood pH abnormal (7.218)     8% Pulse 115     8% Respiratory rate 20     6% Cardiac rhythm Atrial fib     5% Potassium 4.7 mmol/L     3% WBC count 11.5 k/uL     2% Pulse oximetry 100 %     2% Systolic 133     1% BUN abnormal (31 mg/dL)     0% Sodium 140 mmol/L     0% Temperature 97.9 °F (36.6 °C)     0% Hematocrit 44.7 %        Vitals:    10/23/24 0643 10/23/24 0758 10/23/24 0842 10/23/24 0937   BP: (!) 142/100 118/65  133/77   Pulse: (!) 117 (!) 115 (!) 120 (!) 115   Resp: 22 17 20 20   Temp:    97.9 °F (36.6 °C)   SpO2: 96%  100% 100%         Opportunity for questions and clarification was provided.

## 2024-10-23 NOTE — CONSULTS
Pulmonary Consultation    Admit Date: 10/23/2024  Requesting Physician: Rohan Price MD      Reason for consultation:  COPD exacerbation        HPI:  Patient is a 84 year old male who presents to emergency room with ongoing shortness of breath and hypoxia over a couple weeks. Patient had a PCO2 of 107 upon admission. He was placed on Bipap. He had a chest xray that showed bilateral pleural effusions and possible pulmonary edema. Patient Pro-BNP, procalcitonin and respiratory panel pending. He did nbt have any leukocytosis. Patient is lethargic. He did receive one dose of ativan for restlessness in ER. Most information gathered from chart review. This morning patient was RRT for low oxygen saturations. He was transferred from ER without NIV in place. He was placed back on AVAPS, ABG was drawn and again elevated at PCO2 of 108. He remains on mask.     Patient does have known severe COPD. He was recently admitted 2-3 weeks ago for neck trauma where a hematoma was formed causing him to have dysphagia. This had mostly resolved before he was discharged as he was tolerating eating and drinking.     Patient is seen very lethargic during RRT. He was being place back on AVAPS. Lower lungs very diminished.         PMH:    Past Medical History:   Diagnosis Date    COPD (chronic obstructive pulmonary disease) (HCC)     Ischemic colitis (HCC) 2008    Prostate cancer (HCC)     x2 years          PSH:   Past Surgical History:   Procedure Laterality Date    COLONOSCOPY  2009    Dr. Kilgore          Review of Systems:    Unobtainable due to patient factors.          Social History:  Alcohol:  unknown  Tobacco:   quit in 2003  Employment:  No silica or asbestos exposure  Family:  No family history of lung disease      Medications:   sodium chloride        ipratropium 0.5 mg-albuterol 2.5 mg  1 Dose Inhalation Q4H While awake    budesonide  0.5 mg Nebulization BID RT    methylPREDNISolone  40 mg IntraVENous Q8H    arformoterol

## 2024-10-23 NOTE — H&P
Internal Medicine History & Physical     Name: Mitchell Doyle  : 1940  Chief Complaint: Shortness of Breath (Pt arrives on NRB by EMS for sob, 59% on RA when EMS arrived to residence. Family called EMS for comfort care measures. Pt DNRCC)  Primary Care Physician: Rohan Price MD  Admission date: 10/23/2024  Date of service: 10/23/2024   Unit: [facility] EMERGENCY DEPARTMENT     History of Present Illness  Mitchell is a 84 y.o. year old male with a past medical history of stage IV COPD with chronic hypoxic respiratory on 3-4L NC. He presented to the ER on 10/23 with complaints of shortness of breath and hypoxia that have been ongoing for the last few weeks. Daughter reports that patient has been having worsened exertional dyspnea which has been constant and moderate in severity. He has been wearing his oxygen but has noticed that his pulse ox drops into the 70-80's with activity and is taking longer to recover. Patient's wife reports that last night he had increased shortness of breath and his nebulizer was not working. He became somewhat lethargic so EMS was called to bring him to the ER for evaluation. Upon arrival of EMS he was noted to be 59% on his 4L NC so was placed on 15L NRB. Upon arrival to the ER he was noted to be tachycardic and tachypneic. ABGs were obtained and noted respiratory acidosis with pH 7.2 and PCO2 107. Pulmonary was contacted from the ER and he was placed on AVAPS. CXR was obtained and noted increased interstitial markings in the mid and lower lung fields with bilateral pleural effusions concerning for edema. He was given IV steroids and nebulizer treatments with antibiotics. While in the ER he was becoming increasing restless and agitated while on the NIV so was given a dose of Ativan. Family expressed consideration for Hospice however wanted to see how he progresses pending Pulmonary evaluation so decision was made to admit for further  -- will resume  Monitor telemetry  Consider Cardiology consult pending progress    H/o dysphagia with Recent Hematoma Neck  Was seen by ENT during last hospitalization and again as outpatient -- recent office scope showed no acute findings  MBSS during most recent hospitalization recommended regular diet/thin liquids  Family reporting ongoing issues with dysphagia -- keep NPO pending SLP evaluation    Generalized Weakness  PT/OT evaluations    Continue home medications.  Follow labs  DVT prophylaxis with Lovenox  Please see orders for further management and care.   for discharge planning  Discharge plan: TBD pending clinical improvement -- family considering Hospice pending progress    The pertinent details of this case were discussed with Dr. Mckinney.    Mariluz Link, APRN - CNP   10/23/2024  10:26 AM    I can be reached through [employer].    NOTE:  This report was transcribed using voice recognition software.  Every effort was made to ensure accuracy; however, inadvertent computerized transcription errors may be present.    Addendum: I have personally participated in a face-to-face history and physical exam on the date of service with the patient. I have discussed the case with the nurse practitioner. I also participated in medical decision making on the date of service and I agree with all of the pertinent clinical information unless indicated in my editing of the note. I have reviewed and edited the note above based on my findings during my history, exam, and decision making on the same day of service.     My additional thoughts:   He presented to the hospital the chief complaint of shortness of breath  I know the patient well as his daughter is a local physician and my friend  Pulmonology has been consulted as the patient does have underlying COPD and his baseline is 3 to 4 L/min nasal cannula oxygen at baseline  Currently he is requiring BiPAP  Bronchodilators, steroids, antibiotics,

## 2024-10-23 NOTE — CONSULTS
[facility] cardiology/[facility]    INPATIENT CARDIOLOGY CONSULT    Name: Mitchell Doyle    Age: 84 y.o.    Date of Admission: 10/23/2024  4:35 AM    Date of Service: 10/23/2024    Reason for Consultation: Tachycardia in the setting of hypercapnia, COPD exacerbation with hypoxia    Referring Physician:     History of Present Illness: The patient is a 84 y.o. year old male with a known history of longstanding COPD, chronic hypertension, hyperlipidemia who about 2 or 3 weeks ago sustained a neck hematoma that may have compromised his upper airway breathing capacity and apparently his 15 pound cat had hit him in the neck or head.    Per his daughter  Gina Doyle he has been doing worse since that period time.  Progressive shortness of breath and dyspnea on exertion and progressive lethargy and some confusion over the 24 hours prior to admission.    Also on admission noted to be tachycardic heart rate in the 1 10-1 20 range.    Past Medical History: As above, prostate cancer.  No known history of prior stroke, TIA myocardial infarction.    Past surgical history: Denies any major abdominal or thoracic surgical procedures.      Review of Systems:     Constitutional: No fever, chills, sweats  Cardiac: As per HPI  Pulmonary: No cough, wheeze, hemoptysis  HEENT: No visual disturbances or difficult swallowing  GI: No nausea, vomiting, diarrhea, abdominal pain, rectal bleeding  : No dysuria or hematuria  Endocrine: No excessive thirst, heat or cold intolerance.   Musculoskeletal: No joint pain or muscle aches. No claudication  Skin: No skin breakdown or rashes  Neuro: No headache, confusion, or seizures  Psych: No depression, anxiety      Family History:  Family History   Problem Relation Age of Onset    High Blood Pressure Mother     Heart Attack Mother     Other Mother         blood clots     Emphysema Father     Diabetes Sister     Diabetes Brother     High Blood Pressure Brother     Diabetes Brother

## 2024-10-23 NOTE — CARE COORDINATION
Social Work:    Social service received a call from Kellen at [facility] advising that they are active with Mr. Doyle for nursing and therapy.      Electronically signed by VADIM Haas on 10/23/2024 at 1:17 PM

## 2024-10-23 NOTE — CONSULTS
Palliative Care Department  [phone]  Palliative Care Initial Consult  Provider SRIDEVI Barton CNP      PATIENT: Mitchell Doyle  : 1940  MRN: [id number]  ADMISSION DATE: 10/23/2024  4:35 AM  Referring Provider: Yariel Mckinney DO     Palliative Medicine was consulted on hospital day 0 for assistance with Goals of care    HPI:     Clinical Summary:Mitchell Doyle is a 84 y.o. y/o male with a history of stage IV COPD with chronic hypoxic respiratory on 3-4L NC  who presented to [facility] on 10/23/2024 with shortness of breath.  He was hypoxic and placed on AVAPS.  Chest x-ray showed increased interstitial markings with bilateral pleural effusions.  Blood gases showed a pH of 7.2, CO2 107, bicarb 42.7.  He will be admitted for further medical management.    ASSESSMENT/PLAN:     Pertinent Hospital Diagnoses     Acute on chronic hypoxic respiratory failure  Stage IV COPD    Symptom management    Shortness of breath  -Morphine 1 mg every 3 hours as needed    Palliative Care Encounter / Counseling Regarding Goals of Care  Please see detailed goals of care discussion as below  At this time, Mitchell Doyle, Does Not have capacity for medical decision-making.  Capacity is time limited and situation/question specific  During encounter Isabelle and Gina were surrogate medical decision-makers  Outcome of goals of care meeting:  Continue limited DNR CCA/DNI  Plan is to get recommendations from cardiology and pulmonology and reevaluate in 24 hours  Family is considering comfort measures pending progress  Code status Limited DNR CCA/DNI  Advanced Directives: no POA or living will in epic  Surrogate/Legal NOK:  Isabelle Doyle (spouse) [phone]  Gina Doyle (daughter) [phone]    Spiritual assessment: no spiritual distress identified  Bereavement and grief: to be determined  Referrals to: none today    Thank you for the opportunity to participate in the care of Mitchell Doyle.     SRIDEVI Barton CNP

## 2024-10-23 NOTE — ED NOTES
4l nc stating 97%- notified floor [facility]- called respiratory and notified charge nurse

## 2024-10-23 NOTE — ACP (ADVANCE CARE PLANNING)
Advance Care Planning   Healthcare Decision Maker:    Primary Decision Maker: ABBI ETIENNE - Spouse - [phone]    Primary Decision Maker: Gina Etienne - Child - [phone]    Click here to complete Healthcare Decision Makers including selection of the Healthcare Decision Maker Relationship (ie \"Primary\").

## 2024-10-23 NOTE — ED PROVIDER NOTES
[facility] EMERGENCY DEPARTMENT  EMERGENCY DEPARTMENT ENCOUNTER        Pt Name: Mitchell Doyle  MRN: [id number]  Birthdate 1940  Date of evaluation: 10/23/2024  Provider: Sandra Sue MD  PCP: Rohan Price MD  Note Started: 4:53 AM EDT 10/23/24    HPI  84 y.o. male with COPD on 3 liters NC O2 chronically.  Patient 59% at home per EMS, arrived on 15 l nonrebreather.  Per report, patient having shortness of breath for the past 2 weeks, worsened last night.  Wife states that patient's nebulizer was not working last night.      --------------------------------------------- PAST HISTORY ---------------------------------------------  Past Medical History:  has a past medical history of COPD (chronic obstructive pulmonary disease) (HCC), Ischemic colitis (HCC), and Prostate cancer (HCC).    Past Surgical History:  has a past surgical history that includes Colonoscopy (2009).    Social History:  reports that he quit smoking about 21 years ago. His smoking use included cigarettes. He started smoking about 51 years ago. He has a 30 pack-year smoking history. He has never used smokeless tobacco. He reports that he does not currently use alcohol. He reports that he does not use drugs.    Family History: family history includes Diabetes in his brother, brother, and sister; Emphysema in his father; Heart Attack in his mother; High Blood Pressure in his brother, brother, and mother; Other in his mother.     The patient’s home medications have been reviewed.    Allergies: Apixaban and Rivaroxaban      Review of Systems   Unable to perform ROS: Severe respiratory distress        Physical Exam  Constitutional:       Appearance: Normal appearance. He is ill-appearing.      Comments: Patient somnolent but arousable, chronically ill-appearing   HENT:      Head: Normocephalic and atraumatic.      Right Ear: External ear normal.      Left Ear: External ear normal.      Nose: Nose normal.

## 2024-10-24 LAB
ALBUMIN SERPL-MCNC: 3 G/DL (ref 3.5–5.2)
ALP SERPL-CCNC: 101 U/L (ref 40–129)
ALT SERPL-CCNC: 14 U/L (ref 0–40)
ANION GAP SERPL CALCULATED.3IONS-SCNC: 12 MMOL/L (ref 7–16)
AST SERPL-CCNC: 15 U/L (ref 0–39)
BASOPHILS # BLD: 0 K/UL (ref 0–0.2)
BASOPHILS NFR BLD: 0 % (ref 0–2)
BILIRUB SERPL-MCNC: 0.5 MG/DL (ref 0–1.2)
BUN SERPL-MCNC: 50 MG/DL (ref 6–23)
CALCIUM SERPL-MCNC: 9.2 MG/DL (ref 8.6–10.2)
CHLORIDE SERPL-SCNC: 95 MMOL/L (ref 98–107)
CO2 SERPL-SCNC: 37 MMOL/L (ref 22–29)
CREAT SERPL-MCNC: 0.9 MG/DL (ref 0.7–1.2)
EOSINOPHIL # BLD: 0 K/UL (ref 0.05–0.5)
EOSINOPHILS RELATIVE PERCENT: 0 % (ref 0–6)
ERYTHROCYTE [DISTWIDTH] IN BLOOD BY AUTOMATED COUNT: 14.6 % (ref 11.5–15)
GFR, ESTIMATED: 84 ML/MIN/1.73M2
GLUCOSE SERPL-MCNC: 166 MG/DL (ref 74–99)
HCT VFR BLD AUTO: 40 % (ref 37–54)
HGB BLD-MCNC: 12.4 G/DL (ref 12.5–16.5)
LYMPHOCYTES NFR BLD: 0.12 K/UL (ref 1.5–4)
LYMPHOCYTES RELATIVE PERCENT: 1 % (ref 20–42)
MCH RBC QN AUTO: 30.2 PG (ref 26–35)
MCHC RBC AUTO-ENTMCNC: 31 G/DL (ref 32–34.5)
MCV RBC AUTO: 97.3 FL (ref 80–99.9)
MONOCYTES NFR BLD: 0.36 K/UL (ref 0.1–0.95)
MONOCYTES NFR BLD: 3 % (ref 2–12)
NEUTROPHILS NFR BLD: 97 % (ref 43–80)
NEUTS SEG NFR BLD: 13.02 K/UL (ref 1.8–7.3)
NUCLEATED RED BLOOD CELLS: 2 PER 100 WBC
PLATELET # BLD AUTO: 256 K/UL (ref 130–450)
PMV BLD AUTO: 9.7 FL (ref 7–12)
POTASSIUM SERPL-SCNC: 5 MMOL/L (ref 3.5–5)
PROT SERPL-MCNC: 5.6 G/DL (ref 6.4–8.3)
RBC # BLD AUTO: 4.11 M/UL (ref 3.8–5.8)
RBC # BLD: NORMAL 10*6/UL
SODIUM SERPL-SCNC: 144 MMOL/L (ref 132–146)
WBC OTHER # BLD: 13.5 K/UL (ref 4.5–11.5)

## 2024-10-24 PROCEDURE — 2580000003 HC RX 258: Performed by: INTERNAL MEDICINE

## 2024-10-24 PROCEDURE — 6360000002 HC RX W HCPCS

## 2024-10-24 PROCEDURE — 94660 CPAP INITIATION&MGMT: CPT

## 2024-10-24 PROCEDURE — 2700000000 HC OXYGEN THERAPY PER DAY

## 2024-10-24 PROCEDURE — 92610 EVALUATE SWALLOWING FUNCTION: CPT

## 2024-10-24 PROCEDURE — 6360000002 HC RX W HCPCS: Performed by: INTERNAL MEDICINE

## 2024-10-24 PROCEDURE — 80053 COMPREHEN METABOLIC PANEL: CPT

## 2024-10-24 PROCEDURE — 6370000000 HC RX 637 (ALT 250 FOR IP): Performed by: INTERNAL MEDICINE

## 2024-10-24 PROCEDURE — 2500000003 HC RX 250 WO HCPCS: Performed by: INTERNAL MEDICINE

## 2024-10-24 PROCEDURE — 36415 COLL VENOUS BLD VENIPUNCTURE: CPT

## 2024-10-24 PROCEDURE — 96366 THER/PROPH/DIAG IV INF ADDON: CPT

## 2024-10-24 PROCEDURE — 94640 AIRWAY INHALATION TREATMENT: CPT

## 2024-10-24 PROCEDURE — 96376 TX/PRO/DX INJ SAME DRUG ADON: CPT

## 2024-10-24 PROCEDURE — 6360000002 HC RX W HCPCS: Performed by: NURSE PRACTITIONER

## 2024-10-24 PROCEDURE — 96372 THER/PROPH/DIAG INJ SC/IM: CPT

## 2024-10-24 PROCEDURE — 99231 SBSQ HOSP IP/OBS SF/LOW 25: CPT | Performed by: NURSE PRACTITIONER

## 2024-10-24 PROCEDURE — 85025 COMPLETE CBC W/AUTO DIFF WBC: CPT

## 2024-10-24 PROCEDURE — 2060000000 HC ICU INTERMEDIATE R&B

## 2024-10-24 RX ORDER — METHYLPREDNISOLONE SODIUM SUCCINATE 40 MG/ML
40 INJECTION INTRAMUSCULAR; INTRAVENOUS EVERY 12 HOURS
Status: DISCONTINUED | OUTPATIENT
Start: 2024-10-24 | End: 2024-10-25 | Stop reason: HOSPADM

## 2024-10-24 RX ADMIN — WATER 20 ML: 1 INJECTION INTRAMUSCULAR; INTRAVENOUS; SUBCUTANEOUS at 08:37

## 2024-10-24 RX ADMIN — DOXYCYCLINE 100 MG: 100 INJECTION, POWDER, LYOPHILIZED, FOR SOLUTION INTRAVENOUS at 08:38

## 2024-10-24 RX ADMIN — IPRATROPIUM BROMIDE AND ALBUTEROL SULFATE 1 DOSE: 2.5; .5 SOLUTION RESPIRATORY (INHALATION) at 12:35

## 2024-10-24 RX ADMIN — CEFEPIME 2 G: 2 INJECTION, POWDER, FOR SOLUTION INTRAVENOUS at 08:28

## 2024-10-24 RX ADMIN — WATER 20 ML: 1 INJECTION INTRAMUSCULAR; INTRAVENOUS; SUBCUTANEOUS at 19:39

## 2024-10-24 RX ADMIN — ASPIRIN 325 MG: 325 TABLET ORAL at 08:27

## 2024-10-24 RX ADMIN — IPRATROPIUM BROMIDE AND ALBUTEROL SULFATE 1 DOSE: 2.5; .5 SOLUTION RESPIRATORY (INHALATION) at 16:52

## 2024-10-24 RX ADMIN — DOXYCYCLINE 100 MG: 100 INJECTION, POWDER, LYOPHILIZED, FOR SOLUTION INTRAVENOUS at 19:41

## 2024-10-24 RX ADMIN — CEFEPIME 2 G: 2 INJECTION, POWDER, FOR SOLUTION INTRAVENOUS at 19:38

## 2024-10-24 RX ADMIN — ARFORMOTEROL TARTRATE 15 MCG: 15 SOLUTION RESPIRATORY (INHALATION) at 08:47

## 2024-10-24 RX ADMIN — METHYLPREDNISOLONE SODIUM SUCCINATE 40 MG: 40 INJECTION INTRAMUSCULAR; INTRAVENOUS at 17:19

## 2024-10-24 RX ADMIN — METHYLPREDNISOLONE SODIUM SUCCINATE 40 MG: 40 INJECTION INTRAMUSCULAR; INTRAVENOUS at 05:24

## 2024-10-24 RX ADMIN — BUDESONIDE 500 MCG: 0.5 SUSPENSION RESPIRATORY (INHALATION) at 08:47

## 2024-10-24 RX ADMIN — ENOXAPARIN SODIUM 40 MG: 100 INJECTION SUBCUTANEOUS at 08:28

## 2024-10-24 RX ADMIN — SODIUM CHLORIDE, PRESERVATIVE FREE 10 ML: 5 INJECTION INTRAVENOUS at 19:39

## 2024-10-24 RX ADMIN — SODIUM CHLORIDE 10 MG/HR: 900 INJECTION, SOLUTION INTRAVENOUS at 12:06

## 2024-10-24 RX ADMIN — IPRATROPIUM BROMIDE AND ALBUTEROL SULFATE 1 DOSE: 2.5; .5 SOLUTION RESPIRATORY (INHALATION) at 08:47

## 2024-10-24 RX ADMIN — BUMETANIDE 1 MG: 0.25 INJECTION INTRAMUSCULAR; INTRAVENOUS at 17:19

## 2024-10-24 RX ADMIN — DILTIAZEM HYDROCHLORIDE 360 MG: 180 CAPSULE, EXTENDED RELEASE ORAL at 08:26

## 2024-10-24 RX ADMIN — MORPHINE SULFATE 1 MG: 2 INJECTION, SOLUTION INTRAMUSCULAR; INTRAVENOUS at 03:03

## 2024-10-24 RX ADMIN — BUMETANIDE 1 MG: 0.25 INJECTION INTRAMUSCULAR; INTRAVENOUS at 08:28

## 2024-10-24 ASSESSMENT — PAIN SCALES - GENERAL
PAINLEVEL_OUTOF10: 0
PAINLEVEL_OUTOF10: 0

## 2024-10-25 ENCOUNTER — APPOINTMENT (OUTPATIENT)
Dept: GENERAL RADIOLOGY | Age: 84
DRG: 193 | End: 2024-10-25
Payer: MEDICARE

## 2024-10-25 VITALS
RESPIRATION RATE: 18 BRPM | WEIGHT: 144.62 LBS | SYSTOLIC BLOOD PRESSURE: 126 MMHG | OXYGEN SATURATION: 96 % | DIASTOLIC BLOOD PRESSURE: 85 MMHG | BODY MASS INDEX: 21.36 KG/M2 | HEART RATE: 136 BPM | TEMPERATURE: 97.8 F

## 2024-10-25 LAB
ALBUMIN SERPL-MCNC: 2.8 G/DL (ref 3.5–5.2)
ALP SERPL-CCNC: 111 U/L (ref 40–129)
ALT SERPL-CCNC: 15 U/L (ref 0–40)
ANION GAP SERPL CALCULATED.3IONS-SCNC: 11 MMOL/L (ref 7–16)
AST SERPL-CCNC: <5 U/L (ref 0–39)
BASOPHILS # BLD: 0.04 K/UL (ref 0–0.2)
BASOPHILS NFR BLD: 0 % (ref 0–2)
BILIRUB SERPL-MCNC: 0.5 MG/DL (ref 0–1.2)
BUN SERPL-MCNC: 54 MG/DL (ref 6–23)
CALCIUM SERPL-MCNC: 9 MG/DL (ref 8.6–10.2)
CHLORIDE SERPL-SCNC: 96 MMOL/L (ref 98–107)
CO2 SERPL-SCNC: 37 MMOL/L (ref 22–29)
CREAT SERPL-MCNC: 0.9 MG/DL (ref 0.7–1.2)
EKG ATRIAL RATE: 104 BPM
EKG Q-T INTERVAL: 420 MS
EKG QRS DURATION: 82 MS
EKG QTC CALCULATION (BAZETT): 596 MS
EKG R AXIS: 30 DEGREES
EKG T AXIS: 114 DEGREES
EKG VENTRICULAR RATE: 121 BPM
EOSINOPHIL # BLD: 0 K/UL (ref 0.05–0.5)
EOSINOPHILS RELATIVE PERCENT: 0 % (ref 0–6)
ERYTHROCYTE [DISTWIDTH] IN BLOOD BY AUTOMATED COUNT: 14.4 % (ref 11.5–15)
GFR, ESTIMATED: 86 ML/MIN/1.73M2
GLUCOSE SERPL-MCNC: 184 MG/DL (ref 74–99)
HCT VFR BLD AUTO: 36.1 % (ref 37–54)
HGB BLD-MCNC: 11.5 G/DL (ref 12.5–16.5)
IMM GRANULOCYTES # BLD AUTO: 0.21 K/UL (ref 0–0.58)
IMM GRANULOCYTES NFR BLD: 1 % (ref 0–5)
LYMPHOCYTES NFR BLD: 0.18 K/UL (ref 1.5–4)
LYMPHOCYTES RELATIVE PERCENT: 1 % (ref 20–42)
MCH RBC QN AUTO: 30.6 PG (ref 26–35)
MCHC RBC AUTO-ENTMCNC: 31.9 G/DL (ref 32–34.5)
MCV RBC AUTO: 96 FL (ref 80–99.9)
MONOCYTES NFR BLD: 0.31 K/UL (ref 0.1–0.95)
MONOCYTES NFR BLD: 2 % (ref 2–12)
NEUTROPHILS NFR BLD: 95 % (ref 43–80)
NEUTS SEG NFR BLD: 14.46 K/UL (ref 1.8–7.3)
PLATELET # BLD AUTO: 224 K/UL (ref 130–450)
PMV BLD AUTO: 9.5 FL (ref 7–12)
POTASSIUM SERPL-SCNC: 4.5 MMOL/L (ref 3.5–5)
PROT SERPL-MCNC: 5.3 G/DL (ref 6.4–8.3)
RBC # BLD AUTO: 3.76 M/UL (ref 3.8–5.8)
SODIUM SERPL-SCNC: 144 MMOL/L (ref 132–146)
WBC OTHER # BLD: 15.2 K/UL (ref 4.5–11.5)

## 2024-10-25 PROCEDURE — 85025 COMPLETE CBC W/AUTO DIFF WBC: CPT

## 2024-10-25 PROCEDURE — 71045 X-RAY EXAM CHEST 1 VIEW: CPT

## 2024-10-25 PROCEDURE — 94640 AIRWAY INHALATION TREATMENT: CPT

## 2024-10-25 PROCEDURE — 6360000002 HC RX W HCPCS

## 2024-10-25 PROCEDURE — 2580000003 HC RX 258: Performed by: INTERNAL MEDICINE

## 2024-10-25 PROCEDURE — 2500000003 HC RX 250 WO HCPCS: Performed by: INTERNAL MEDICINE

## 2024-10-25 PROCEDURE — 94660 CPAP INITIATION&MGMT: CPT

## 2024-10-25 PROCEDURE — 96376 TX/PRO/DX INJ SAME DRUG ADON: CPT

## 2024-10-25 PROCEDURE — 96372 THER/PROPH/DIAG INJ SC/IM: CPT

## 2024-10-25 PROCEDURE — 96366 THER/PROPH/DIAG IV INF ADDON: CPT

## 2024-10-25 PROCEDURE — 93010 ELECTROCARDIOGRAM REPORT: CPT | Performed by: INTERNAL MEDICINE

## 2024-10-25 PROCEDURE — 80053 COMPREHEN METABOLIC PANEL: CPT

## 2024-10-25 PROCEDURE — 6360000002 HC RX W HCPCS: Performed by: INTERNAL MEDICINE

## 2024-10-25 PROCEDURE — 2700000000 HC OXYGEN THERAPY PER DAY

## 2024-10-25 PROCEDURE — 6360000002 HC RX W HCPCS: Performed by: NURSE PRACTITIONER

## 2024-10-25 PROCEDURE — 6360000002 HC RX W HCPCS: Performed by: HOSPITALIST

## 2024-10-25 PROCEDURE — 6370000000 HC RX 637 (ALT 250 FOR IP): Performed by: INTERNAL MEDICINE

## 2024-10-25 PROCEDURE — 6370000000 HC RX 637 (ALT 250 FOR IP): Performed by: HOSPITALIST

## 2024-10-25 RX ORDER — BUDESONIDE 0.5 MG/2ML
0.5 INHALANT ORAL
DISCHARGE
Start: 2024-10-25 | End: 2024-10-29

## 2024-10-25 RX ORDER — METOLAZONE 2.5 MG/1
2.5 TABLET ORAL DAILY
Status: DISCONTINUED | OUTPATIENT
Start: 2024-10-25 | End: 2024-10-25 | Stop reason: HOSPADM

## 2024-10-25 RX ORDER — DILTIAZEM HYDROCHLORIDE EXTENDED-RELEASE TABLETS 240 MG/1
240 TABLET, EXTENDED RELEASE ORAL EVERY MORNING
DISCHARGE
Start: 2024-10-25 | End: 2024-10-29

## 2024-10-25 RX ORDER — IPRATROPIUM BROMIDE AND ALBUTEROL SULFATE 2.5; .5 MG/3ML; MG/3ML
3 SOLUTION RESPIRATORY (INHALATION)
DISCHARGE
Start: 2024-10-25 | End: 2024-10-29

## 2024-10-25 RX ORDER — ARFORMOTEROL TARTRATE 15 UG/2ML
15 SOLUTION RESPIRATORY (INHALATION)
DISCHARGE
Start: 2024-10-25 | End: 2024-10-29

## 2024-10-25 RX ORDER — IPRATROPIUM BROMIDE AND ALBUTEROL SULFATE 2.5; .5 MG/3ML; MG/3ML
1 SOLUTION RESPIRATORY (INHALATION)
Status: DISCONTINUED | OUTPATIENT
Start: 2024-10-26 | End: 2024-10-25 | Stop reason: HOSPADM

## 2024-10-25 RX ORDER — MORPHINE SULFATE 100 MG/5ML
10 SOLUTION ORAL
Status: SHIPPED | DISCHARGE
Start: 2024-10-25 | End: 2024-10-29

## 2024-10-25 RX ORDER — BUMETANIDE 0.25 MG/ML
2 INJECTION, SOLUTION INTRAMUSCULAR; INTRAVENOUS ONCE
Status: COMPLETED | OUTPATIENT
Start: 2024-10-25 | End: 2024-10-25

## 2024-10-25 RX ADMIN — MORPHINE SULFATE 1 MG: 2 INJECTION, SOLUTION INTRAMUSCULAR; INTRAVENOUS at 17:18

## 2024-10-25 RX ADMIN — PANTOPRAZOLE SODIUM 40 MG: 40 TABLET, DELAYED RELEASE ORAL at 05:03

## 2024-10-25 RX ADMIN — BUMETANIDE 1 MG: 0.25 INJECTION INTRAMUSCULAR; INTRAVENOUS at 07:24

## 2024-10-25 RX ADMIN — ARFORMOTEROL TARTRATE 15 MCG: 15 SOLUTION RESPIRATORY (INHALATION) at 21:13

## 2024-10-25 RX ADMIN — IPRATROPIUM BROMIDE AND ALBUTEROL SULFATE 1 DOSE: 2.5; .5 SOLUTION RESPIRATORY (INHALATION) at 00:26

## 2024-10-25 RX ADMIN — SODIUM CHLORIDE 15 MG/HR: 900 INJECTION, SOLUTION INTRAVENOUS at 12:23

## 2024-10-25 RX ADMIN — METHYLPREDNISOLONE SODIUM SUCCINATE 40 MG: 40 INJECTION INTRAMUSCULAR; INTRAVENOUS at 05:03

## 2024-10-25 RX ADMIN — BUDESONIDE 500 MCG: 0.5 SUSPENSION RESPIRATORY (INHALATION) at 21:13

## 2024-10-25 RX ADMIN — BUDESONIDE 500 MCG: 0.5 SUSPENSION RESPIRATORY (INHALATION) at 07:48

## 2024-10-25 RX ADMIN — CEFEPIME 2 G: 2 INJECTION, POWDER, FOR SOLUTION INTRAVENOUS at 07:33

## 2024-10-25 RX ADMIN — DILTIAZEM HYDROCHLORIDE 360 MG: 180 CAPSULE, EXTENDED RELEASE ORAL at 07:24

## 2024-10-25 RX ADMIN — PETROLATUM: 420 OINTMENT TOPICAL at 09:42

## 2024-10-25 RX ADMIN — WATER 20 ML: 1 INJECTION INTRAMUSCULAR; INTRAVENOUS; SUBCUTANEOUS at 07:33

## 2024-10-25 RX ADMIN — MORPHINE SULFATE 1 MG: 2 INJECTION, SOLUTION INTRAMUSCULAR; INTRAVENOUS at 06:39

## 2024-10-25 RX ADMIN — METHYLPREDNISOLONE SODIUM SUCCINATE 40 MG: 40 INJECTION INTRAMUSCULAR; INTRAVENOUS at 15:44

## 2024-10-25 RX ADMIN — ARFORMOTEROL TARTRATE 15 MCG: 15 SOLUTION RESPIRATORY (INHALATION) at 00:26

## 2024-10-25 RX ADMIN — IPRATROPIUM BROMIDE AND ALBUTEROL SULFATE 1 DOSE: 2.5; .5 SOLUTION RESPIRATORY (INHALATION) at 07:48

## 2024-10-25 RX ADMIN — IPRATROPIUM BROMIDE AND ALBUTEROL SULFATE 1 DOSE: 2.5; .5 SOLUTION RESPIRATORY (INHALATION) at 12:34

## 2024-10-25 RX ADMIN — MORPHINE SULFATE 1 MG: 2 INJECTION, SOLUTION INTRAMUSCULAR; INTRAVENOUS at 20:55

## 2024-10-25 RX ADMIN — IPRATROPIUM BROMIDE AND ALBUTEROL SULFATE 1 DOSE: 2.5; .5 SOLUTION RESPIRATORY (INHALATION) at 16:15

## 2024-10-25 RX ADMIN — ENOXAPARIN SODIUM 40 MG: 100 INJECTION SUBCUTANEOUS at 07:24

## 2024-10-25 RX ADMIN — BUMETANIDE 2 MG: 0.25 INJECTION INTRAMUSCULAR; INTRAVENOUS at 15:44

## 2024-10-25 RX ADMIN — SODIUM CHLORIDE, PRESERVATIVE FREE 10 ML: 5 INJECTION INTRAVENOUS at 07:24

## 2024-10-25 RX ADMIN — BUDESONIDE 500 MCG: 0.5 SUSPENSION RESPIRATORY (INHALATION) at 00:26

## 2024-10-25 RX ADMIN — DOXYCYCLINE 100 MG: 100 INJECTION, POWDER, LYOPHILIZED, FOR SOLUTION INTRAVENOUS at 07:30

## 2024-10-25 RX ADMIN — IPRATROPIUM BROMIDE AND ALBUTEROL SULFATE 1 DOSE: 2.5; .5 SOLUTION RESPIRATORY (INHALATION) at 21:13

## 2024-10-25 RX ADMIN — ARFORMOTEROL TARTRATE 15 MCG: 15 SOLUTION RESPIRATORY (INHALATION) at 07:48

## 2024-10-25 RX ADMIN — ASPIRIN 325 MG: 325 TABLET ORAL at 07:24

## 2024-10-25 RX ADMIN — METOLAZONE 2.5 MG: 2.5 TABLET ORAL at 13:48

## 2024-10-25 ASSESSMENT — PAIN SCALES - GENERAL
PAINLEVEL_OUTOF10: 0

## 2024-10-25 NOTE — DISCHARGE SUMMARY
Internal Medicine Discharge Summary    NAME: Mitchell Doyle :  1940  MRN:  [id number] PCP:Rohan Price MD    ADMITTED: 10/23/2024   DISCHARGED: No discharge date for patient encounter.    ADMITTING PHYSICIAN: Perez Arita MD    PCP: Rohan Price MD    CONSULTANT(S):   IP CONSULT TO PULMONOLOGY  IP CONSULT TO INTERNAL MEDICINE  IP CONSULT TO PALLIATIVE CARE  IP CONSULT TO CARDIOLOGY  IP CONSULT TO VASCULAR ACCESS TEAM  IP CONSULT TO HOSPICE     ADMITTING DIAGNOSIS:   Acute respiratory failure with hypoxia [J96.01]  Acute respiratory failure with hypoxia and hypercapnia [J96.01, J96.02]     Please see H&P for further details    DISCHARGE DIAGNOSES:   Active Hospital Problems    Diagnosis     Acute respiratory failure with hypoxia [J96.01]     Dysphagia [R13.10]     PAF (paroxysmal atrial fibrillation) (HCC) [I48.0]     Venous insufficiency (chronic) (peripheral) [I87.2]     COPD (chronic obstructive pulmonary disease) (HCC) [J44.9]     Panlobular emphysema (HCC) [J43.1]     Prostate cancer (HCC) [C61]        BRIEF HISTORY OF PRESENT ILLNESS: Mitchell Doyle is a 84 y.o. male patient of Rohan Price MD who  has a past medical history of COPD (chronic obstructive pulmonary disease) (HCC), Ischemic colitis (HCC), and Prostate cancer (HCC). who originally had concerns including Shortness of Breath (Pt arrives on NRB by EMS for sob, 59% on RA when EMS arrived to residence. Family called EMS for comfort care measures. Pt DNRCC). at presentation on 10/23/2024, and was found to have Acute respiratory failure with hypoxia [J96.01]  Acute respiratory failure with hypoxia and hypercapnia [J96.01, J96.02] after workup.    Please see H&P for further details.    HOSPITAL COURSE:   The patient presented to the hospital with the chief complaint of Shortness of Breath (Pt arrives on NRB by EMS for sob, 59% on RA when EMS arrived to residence. Family called EMS for comfort care measures. Pt DNRCC)  . The patient was  a history or smoking or known risk factors. Radiology 2017 http://pubs.rsna.org/doi/full/10.1148/radiol.5606034495     1. Subpleural right upper lobe nodule 0.7 cm.  Follow-up recommendations above. 2. Emphysema, bronchitis. 3. Mild subacute appearing compression fractures at T5 through T7. Correlate for focal pain and tenderness at this site.     FL ESOPHAGRAM    Result Date: 2024                                      [facility]                                          [place], [place] [id number] [phone]                                                XRay Report                                                  Signed    Patient: NAHED ETIENNE                                                                MR#: [id number]          : 1940                                                                Acct:[id number]            Age/Sex: 84 / M                                                                Admit Date: 24            Loc: ER                                                                                     Attending Dr:     Ordering Physician: Rhonda Ramirez MD   Date of Service: 24  Procedure(s): XR esophagram  Accession Number(s): [id number]    cc: Rhonda Ramirez MD      INDICATIONS:  Dysphagia.  Patient feels like something is stuck in throat.     TECHNIQUE:  XR UGI Esophagram. Frontal and lateral chest and neck views (11 total  images).     COMPARISON:  CXR 2024.     FINDINGS:  AP and lateral radiographs of the upper chest were obtained after the oral  ingestion of contrast.  No fluoroscopic real-time esophogogram was performed.     Images demonstrate contrast within the lower half of the thoracic esophagus, with  the esophagus demonstrate normal diameter.  Contrast seems to pass rapidly to the  stomach, opacifying a normal diameter gastric

## 2024-10-25 NOTE — CARE COORDINATION
Social Work:    Advised by unit RN of preference for [facility] ([phone] fax [phone]).  Social service called and faxed a referral to Yokasta Mena, director at [facility].  Yokasta advised that she is aware of referral and has spoken with Dr. Gina Doyle. Yokasta requested medical update and order in which social service sent via fax.  Yokasta briefly spoke with Dr. Doyle about possible [facility] with hospice vs home. Adry DELAROSA at [facility] is coming to [facility] to meet with the family now.  Social service sent a tentative message to Chanel at [facility] about possible need. Judd at [facility] will need updated on plans.  Social service to continue to follow.    Electronically signed by VADIM Haas on 10/25/2024

## 2024-10-25 NOTE — PROGRESS NOTES
Pulmonary Progress Note    Admit Date: 10/23/2024  Hospital day                               PCP: Rohan Price MD    Chief Complaint (s):  Patient Active Problem List   Diagnosis    Panlobular emphysema (HCC)    Elevated lipoprotein(a)    Prostate cancer (HCC)    COPD (chronic obstructive pulmonary disease) (HCC)    Venous insufficiency (chronic) (peripheral)    PAF (paroxysmal atrial fibrillation) (HCC)    Dysphagia    Hematoma of pharynx    Acute respiratory failure with hypoxia       Subjective:  This p.m., the patient is adamant about suspending any further treatment and wishes only to be made comfortable.  Family is at the bedside.      Vitals:  VITALS:  /74   Pulse (!) 112   Temp 97.5 °F (36.4 °C) (Temporal)   Resp 20   Wt 65.6 kg (144 lb 10 oz)   SpO2 94%   BMI 21.36 kg/m²     24HR INTAKE/OUTPUT:      Intake/Output Summary (Last 24 hours) at 10/25/2024 1256  Last data filed at 10/25/2024 0942  Gross per 24 hour   Intake --   Output 1500 ml   Net -1500 ml       24HR PULSE OXIMETRY RANGE:    SpO2  Av.6 %  Min: 93 %  Max: 98 %    Medications:  IV:   sodium chloride      dilTIAZem 15 mg/hr (10/25/24 1223)       Scheduled Meds:   metOLazone  2.5 mg Oral Daily    methylPREDNISolone  40 mg IntraVENous Q12H    ipratropium 0.5 mg-albuterol 2.5 mg  1 Dose Inhalation Q4H While awake    budesonide  0.5 mg Nebulization BID RT    arformoterol tartrate  15 mcg Nebulization BID RT    doxycycline (VIBRAMYCIN) IV  100 mg IntraVENous Q12H    sodium chloride flush  10 mL IntraVENous 2 times per day    enoxaparin  40 mg SubCUTAneous Daily    aspirin  325 mg Oral Daily    pantoprazole  40 mg Oral QAM AC    bumetanide  1 mg IntraVENous BID    dilTIAZem  360 mg Oral Daily    ceFEPIme  2 g IntraVENous Q12H    And    sterile water  20 mL Injection Q12H       Diet:   ADULT DIET; Dysphagia - Pureed     EXAM:  General: No distress. Alert.  Eyes: PERRL. No sclera icterus. No conjunctival 
   10/23/24 1607   NIV Type   NIV Started/Stopped On   Equipment Type v60   Mode AVAPS   Mask Type Full face mask   Mask Size Large   Assessment   Level of Consciousness 0   Comfort Level Good   Using Accessory Muscles No   Mask Compliance Good   Skin Assessment Clean, dry, & intact   Skin Protection for O2 Device Yes   Orientation Middle   Location Nose   Intervention(s) Skin Barrier   Settings/Measurements   CPAP/EPAP 6 cmH2O   IPAP Min 18 cmH2O   IPAP Max 25 cmH2O   Vt (Set, mL) 450 mL   Vt (Measured) 544 mL   Rate Ordered 20   FiO2  80 %   I Time/ I Time % 0.75 s   Minute Volume (L/min) 11 Liters   Mask Leak (lpm) 35 lpm   Patient's Home Machine No   Alarm Settings   Alarms On Y   Low Pressure (cmH2O) 8 cmH2O   High Pressure (cmH2O) 40 cmH2O   RR Low (bpm) 20   RR High (bpm) 50 br/min     Date: 10/23/2024    Time: 4:09 PM    Patient Placed On BIPAP/CPAP/ Non-Invasive Ventilation?  No, patient found on BiPAP.    If no must comment.  Facial area red/color change? No           If YES are Blister/Lesion present?No   If yes must notify nursing staff  BIPAP/CPAP skin barrier?  Yes    Skin barrier type:mepilexlite       Comments:        Cici Crespo RCP  
   10/23/24 1742   Oxygen Therapy/Pulse Ox   Blood Gas  Performed? Yes   $ABG $Arterial Puncture   Negrito's Test #1 Pos   Site #1 Left Radial   Site Prepped #1 Yes   Number of Attempts #1 1   Pressure Held #1 Yes   Complications #1 None   Post-procedure #1 Standard   Specimen Status #1 To lab   How Tolerated? Tolerated well       
  Palliative Care Department  [phone]  Palliative Care Initial Consult  Provider SRIDEVI Barton CNP      PATIENT: Mitchell Doyle  : 1940  MRN: [id number]  ADMISSION DATE: 10/23/2024  4:35 AM  Referring Provider: Yariel Mckinney DO     Palliative Medicine was consulted on hospital day 0 for assistance with Goals of care    HPI:     Clinical Summary:Mitchell Doyle is a 84 y.o. y/o male with a history of stage IV COPD with chronic hypoxic respiratory on 3-4L NC  who presented to [facility] 10/23/[facility]24 with shortness of breath.  He was hypoxic and placed on AVAPS.  Chest x-ray showed increased interstitial markings with bilateral pleural effusions.  Blood gases showed a pH of 7.2, CO2 107, bicarb 42.7.  He will be admitted for further medical management.    ASSESSMENT/PLAN:     Pertinent Hospital Diagnoses     Acute on chronic hypoxic respiratory failure  Stage IV COPD    Symptom management    Shortness of breath  -Morphine 1 mg every 3 hours as needed    Palliative Care Encounter / Counseling Regarding Goals of Care  Please see detailed goals of care discussion as below  At this time, Mitchell Doyle, Does Not have capacity for medical decision-making.  Capacity is time limited and situation/question specific  During encounter Isabelle and Gina were surrogate medical decision-makers  Outcome of goals of care meeting:  Continue limited DNR CCA/DNI  Plan is to get recommendations from cardiology and pulmonology and reevaluate in 24 hours  Family is considering comfort measures pending progress  Code status Limited DNR CCA/DNI  Advanced Directives: no POA or living will in epic  Surrogate/Legal NOK:  Isabelle Doyle (spouse) [phone]  Gina Doyle (daughter) [phone]    Spiritual assessment: no spiritual distress identified  Bereavement and grief: to be determined  Referrals to: none today    Thank you for the opportunity to participate in the care of Mitchell Doyle.     SRIDEVI Barton CNP 
  SPEECH/LANGUAGE PATHOLOGY  CLINICAL ASSESSMENT OF SWALLOWING FUNCTION   and PLAN OF CARE      PATIENT NAME:  Mitchell Doyle  (male)     MRN:  [id number]    :  1940  (84 y.o.)  STATUS:  Inpatient: Room 0407/0407-A    TODAY'S DATE:  10/24/2024  ORDER DATE, DESCRIPTION AND REFERRING PROVIDER: SRIDEVI Dsouza CNP  REASON FOR REFERRAL: Evaluate swallowing function   EVALUATING THERAPIST: Misty Brandt SLP                 RESULTS:    DYSPHAGIA DIAGNOSIS:   Clinical indicators of mild  pharyngeal phase dysphagia   Per chart review, patient with hx of dysphagia. Most recent MBSS completed 10/1/2024 revealed functional oropharyngeal swallow for age/premorbid functioning and esophageal motility deficit suspected. At that time, patient with no penetration or aspiration, however, retrograde from the esophagus to the hypopharynx was noted. Unclear if retrograde symptoms have worsened. If aspiration is suspected, recommend MBSS to determine safest LRD at this time.       DIET RECOMMENDATIONS:  Pureed consistency solids (IDDSI level 4) with  thin liquids (IDDSI level 0)     FEEDING RECOMMENDATIONS:     Assistance level:  Set-up is required for all oral intake      Compensatory strategies recommended: Thorough oral care to prevent colonization of oral bacteria   Upright in bed/ chair as tolerated  Fully alert for all PO  SINGLE cup sips  Liquid wash after thicker items to assist with clearing pharyngeal residue       Discussed recommendations with:  patient nurse in person    SPEECH THERAPY  PLAN OF CARE   The dysphagia POC is established based on physician order, dysphagia diagnosis and results of clinical assessment     Meal time assessment for 1-2 sessions to provide diet modification and compensatory strategy implementation due to patient report of dysphagia symptoms during meals and inconsistent clinical indicators of dysphagia    Conditions Requiring Skilled Therapeutic Intervention for dysphagia:    Patient is 
4 Eyes Skin Assessment     NAME:  Mitchell Doyle  YOB: 1940  MEDICAL RECORD NUMBER:  [id number]    The patient is being assessed for  Admission    I agree that at least one RN has performed a thorough Head to Toe Skin Assessment on the patient. ALL assessment sites listed below have been assessed.      Areas assessed by both nurses:    Head, Face, Ears, Shoulders, Back, Chest, Arms, Elbows, Hands, Sacrum. Buttock, Coccyx, Ischium, and Legs. Feet and Heels        Does the Patient have a Wound? Yes wound(s) were present on assessment. LDA wound assessment was Initiated and completed by RN       Dar Prevention initiated by RN: Yes  Wound Care Orders initiated by RN: Yes    Pressure Injury (Stage 3,4, Unstageable, DTI, NWPT, and Complex wounds) if present, place Wound referral order by RN under :     New Ostomies, if present place, Ostomy referral order under : No     Nurse 1 eSignature: Electronically signed by Brooke Darling RN on 10/23/24 at 1:55 PM EDT    **SHARE this note so that the co-signing nurse can place an eSignature**    Nurse 2 eSignature: Electronically signed by Maria Victoria Cabrera RN on 10/23/24 at 6:06 PM EDT    
Date: 10/23/2024    Time: 5:19 AM    Patient Placed On BIPAP/CPAP/ Non-Invasive Ventilation?  Yes    If no must comment.  Facial area red/color change? No           If YES are Blister/Lesion present?No   If yes must notify nursing staff  BIPAP/CPAP skin barrier?  Yes    Skin barrier type:mepilexlite       Comments:        Yasmin Funes RCP  
Date: 10/24/2024    Time: 12:40 PM    Patient Placed On BIPAP/CPAP/ Non-Invasive Ventilation?  No    If no must comment.  Facial area red/color change? No           If YES are Blister/Lesion present?No   If yes must notify nursing staff  BIPAP/CPAP skin barrier?  Yes    Skin barrier type:mepilexlite       Comments:  Patient is not on non invasive      Apple Palacio RCP  
Dr. Mckinney notified of RRT  Palliative medicine notified of consult.   
Dr. Rivero notified HR sustaining 130's-140. Orders given.  
Internal Medicine Progress Note    Patient's name: Mitchell Doyle  : 1940  Chief complaints (on day of admission): Shortness of Breath (Pt arrives on NRB by EMS for sob, 59% on RA when EMS arrived to residence. Family called EMS for comfort care measures. Pt DNRCC)  Admission date: 10/23/2024  Date of service: 10/24/2024   Room: [address] INTERNAL MEDICINE   Primary care physician: Rohan Price MD  Reason for visit: Follow-up for acute on chronic hypoxic/hypercapnic respiratory failure    Subjective  Mitchell is seen sitting up in bed awake and alert on the AVAPS in no distress.  He reports that he is feeling much better, does not really feel short of breath.  He reports that his mouth is dry and he would like some water.  He denies any nausea or vomiting.  He reports that he was very hot during the night and needed to use an ice pack on his head.  Daughter is present at the bedside and expresses some concerns as he does seem to be coughing anytime he uses one of the mouth swabs, asking if his liquids should be thickened.  In discussion with nursing he has been on the AVAPS since yesterday without any issues, hoping to trial off and on nasal cannula today.  No other issues or concerns from nursing.    Review of Systems  Full 10 point review of systems negative unless mentioned above.    Hospital Medications  Current Facility-Administered Medications   Medication Dose Route Frequency Provider Last Rate Last Admin    melatonin tablet 3 mg  3 mg Oral Nightly PRN Yariel Mckinney, DO        ipratropium 0.5 mg-albuterol 2.5 mg (DUONEB) nebulizer solution 1 Dose  1 Dose Inhalation Q4H While awake Yariel Mckinney DO   1 Dose at 10/23/24 2021    budesonide (PULMICORT) nebulizer suspension 500 mcg  0.5 mg Nebulization BID RT Yariel Mckinney DO   500 mcg at 10/23/24 2021    methylPREDNISolone sodium succ (SOLU-MEDROL) injection 40 mg  40 mg IntraVENous Q8H Yariel Mckinney DO   40 mg at 10/24/24 0522    
[facility] cardiology office notified of consult.   
Patient arrived from ED O2 sat 39% on 4 liters. Non rebeather applied. RRT called  
Patient's family has chosen [facility]. Case management updated.   
Pt refusing to wear bipap.  
Pt was moved by staff to new room in 407 per RN request.  
SPIRITUAL HEALTH SERVICES - [facility]  PROGRESS NOTE    Name: Mitchell Doyle                Nondenominational: Sabianist   Anointed (Last Rites): NA    Referral: Rapid Response    Assessment:  Upon entering the room  observes Patient being cared for by medical staff. Patient's Wife and Daughter came to room shortly after RRT began. Patient was admitted from ED.      Intervention:   provided prayer silently outside Patient's room and then introduced self to Patient's Family.  offered prayer for Patient which Family accepted. Patient was placed on bi-pap by medical team.    Outcome:   sat with Wife and Daughter while RN wrapped Patient's wounds, after RRT was complete. Family appreciative of 's time.    Plan:  Chaplains will remain available to offer spiritual and emotional support as needed.      Electronically signed by LUIS MANUEL Clements, on 10/23/2024 at 10:42 AM.  Spiritual Care Department  [facility]  [phone]     
[facility]    INPATIENT CARDIOLOGY FOLLOW-UP    Name: Mitchell Doyle    Age: 84 y.o.    PCP: Rohan Price MD    Date of Admission: 10/23/2024  4:35 AM    Date of Service: 10/25/2024    Chief Complaint: Follow-up for atrial fib  History of Present Illness: The patient is a 84 y.o. year old male with a known history of longstanding COPD, chronic hypertension, hyperlipidemia who about 2 or 3 weeks ago sustained a neck hematoma that may have compromised his upper airway breathing capacity and apparently his 15 pound cat had hit him in the neck or head.     Per his daughter Dr. Gina Doyle he has been doing worse since that period time.  Progressive shortness of breath and dyspnea on exertion and progressive lethargy and some confusion over the 24 hours prior to admission.     Also on admission noted to be tachycardic heart rate in the 1 10-1 20 range.     Past Medical History: As above, prostate cancer.  No known history of prior stroke, TIA myocardial infarction.     Interim History:10/24/24:  Wife and daughter present today.  No new overnight cardiac complaints. Currently with no complaints of CP, SOB, palpitations, dizziness, or lightheadedness.On cardizem gtt 15, NPO - to have swallowing eval - on bipap at present    Telemetry personally reviewed and showed atrial fib rates around 110    10/25/2024:  Still on Cardizem drip 15 mg.  On 3 to 4 L O2.  Chart states he refused BiPAP but he states he used it for few hours overnight.  Just worn out but in no pain.  Is allowed to take p.o. now  Telemetry atrial fibrillation rates 1 10-1 20    Review of Systems:   Cardiac: As per HPI  General: No fever, chills  Pulmonary: No cough, wheeze, or shortness of breath  GI: No nausea, vomiting,or abdominal pain  Neuro: No headache or confusion    Problem List:  Principal Problem:    Acute respiratory failure with hypoxia  Active Problems:    Panlobular emphysema (HCC)    Prostate cancer (HCC)    COPD (chronic 
[facility]    INPATIENT CARDIOLOGY FOLLOW-UP    Name: Mitchell Doyle    Age: 84 y.o.    PCP: Rohan Price MD    Date of Admission: 10/23/2024  4:35 AM    Date of Service: 10/24/2024    Chief Complaint: Follow-up for atrial fib  History of Present Illness: The patient is a 84 y.o. year old male with a known history of longstanding COPD, chronic hypertension, hyperlipidemia who about 2 or 3 weeks ago sustained a neck hematoma that may have compromised his upper airway breathing capacity and apparently his 15 pound cat had hit him in the neck or head.     Per his daughter Renae Gina Doyle he has been doing worse since that period time.  Progressive shortness of breath and dyspnea on exertion and progressive lethargy and some confusion over the 24 hours prior to admission.     Also on admission noted to be tachycardic heart rate in the 1 10-1 20 range.     Past Medical History: As above, prostate cancer.  No known history of prior stroke, TIA myocardial infarction.     Interim History:10/24/24:  Wife and daughter present today.  No new overnight cardiac complaints. Currently with no complaints of CP, SOB, palpitations, dizziness, or lightheadedness.On cardizem gtt 15, NPO - to have swallowing eval - on bipap at present    Telemetry personally reviewed and showed atrial fib rates around 110      Review of Systems:   Cardiac: As per HPI  General: No fever, chills  Pulmonary: No cough, wheeze, or shortness of breath  GI: No nausea, vomiting,or abdominal pain  Neuro: No headache or confusion    Problem List:  Principal Problem:    Acute respiratory failure with hypoxia  Active Problems:    Panlobular emphysema (HCC)    Prostate cancer (HCC)    COPD (chronic obstructive pulmonary disease) (HCC)    Venous insufficiency (chronic) (peripheral)    PAF (paroxysmal atrial fibrillation) (HCC)    Dysphagia  Resolved Problems:    * No resolved hospital problems. *      Past Medical History:  Past Medical History: 
Wound care notified of new consult via perfect serve.  
hospital admission for neck hematoma and some dysphagia as a result of the hematoma.   Atrial fibrillation with RVR.   Lung nodule 9 mm right apex previous CT scan.     Plan:  Repeat chest xray in the morning   Continue antibiotics. Speech therapy evaluation pending.   Supplemental oxygen.  Wean steroids   AVAPS at HS and daytime naps. Patient may require this at discharge.       Time at the bedside, reviewing labs and radiographs, reviewing updated notes and consultations, discussing with staff and family was more than 50 minutes.    Please note that voice recognition technology was used in the preparation of this note and make therefore it may contain inadvertent transcription errors.  If the patient is a COVID 19 isolation patient, the above physical exam reflects that of the examining physician for the day.        SRIDEVI Hatfield-DAINA  Attestation    The patient was seen and personally examined.  History is obtained through the patient by myself.  Impression and plan are mine.    Discussed with family, will need NIMV at home.    Documentation only provided per the nurse practitioner.    Additions and corrections are reflected in the signed note.    Salty Roberts MD,  M.D., F.C.C.P.    [facility]    [facility], [address] [place] [address], [address], [place] [address]  Office visits:  [address] [place] [address]

## 2024-10-25 NOTE — PLAN OF CARE
Problem: Safety - Adult  Goal: Free from fall injury  10/24/2024 2024 by Lissett Diehl, RN  Outcome: Progressing     Problem: Discharge Planning  Goal: Discharge to home or other facility with appropriate resources  Outcome: Progressing     Problem: Skin/Tissue Integrity  Goal: Absence of new skin breakdown  Description: 1.  Monitor for areas of redness and/or skin breakdown  2.  Assess vascular access sites hourly  3.  Every 4-6 hours minimum:  Change oxygen saturation probe site  4.  Every 4-6 hours:  If on nasal continuous positive airway pressure, respiratory therapy assess nares and determine need for appliance change or resting period.  10/24/2024 2024 by Lissett Diehl, RN  Outcome: Progressing

## 2024-10-25 NOTE — ACP (ADVANCE CARE PLANNING)
Advance Care Planning   Healthcare Decision Maker:    Primary Decision Maker: ABBI ETIENNE - Spouse - [phone]    Primary Decision Maker: Gina Etienne - Child - [phone]    Click here to complete Healthcare Decision Makers including selection of the Healthcare Decision Maker Relationship (ie \"Primary\").

## 2024-10-25 NOTE — CARE COORDINATION
Social Work:    Social work was advised by family of plans to transfer to [facility] with [facility]. Both [facility] and [facility] accepted, however, Dr. Doyle inquired about the possibility of transferring Mr. Doyle to the [facility].  Social work advised Dr. Doyle of need for [facility] consult which she then requested. Social work sent a referral to [facility] and notified renée Jewell.  If Mr. Doyle does not meet [facility] criteria, Júnior at [facility] advised that they can accept Mr. Doyle today or on the weekend. [facility] will need updated if going to [facility] with their care is the final plan.  [facility] will need an [place] [facility].     Electronically signed by VADIM Haas on 10/25/2024 at 3:48 PM

## 2024-10-25 NOTE — CARE COORDINATION
Social Work:    Chart reviewed. Mr. Doyle was admitted via EMS from home due to shortness of breath.  Mr. Doyle has a history of stage IV COPD with chronic hypoxia. Social service met with Mr. & Mrs. Doyle, as well as Mr. Doyle's sister Staci & her . Social service also placed a call to daughter, Dr. Gina Doyle.  Social service explained transition of care social service role, as well as discussed discharge planning. Mr. Doyle is a patient of Dr. Rohan Pate. He resides with Mrs. Doyle in a 2-story home with 4 entry steps with handrails, half bath on the main floor with bedroom, tub/shower, upstairs, walk-in shower located in the basement. Mr. Doyle has home 02 (3-4 liters) from [facility], as well as a rollator and BSC. He is currently active with [facility].  Social work advised Dr. Doyle about the NIV order and she has no agency preference. The discharge plan presently is to return home with [facility] for nursing, therapy, shower aide. Mr. Doyle will need an ambulance ride home.  Dr. Doyle asked to have [facility] go the home to check the oxygen concentrator as she states that it is not working accurately.  Social work called and faxed a referral for the NIV to Judd cort[facility]. Social work attempted to reach [facility] physician line ([phone]) unsuccessfully today. Social work to continue to follow.    Electronically signed by VADIM Haas on 10/25/2024 at 1:03 PM

## 2024-10-25 NOTE — FLOWSHEET NOTE
Inpatient Wound Care    Admit Date: 10/23/2024  4:35 AM    Reason for consult:  Legs, L arm    Significant history:  Admitted with acute respiratory failure, hypoxia. History includes: prostate cancer, COPD, PAF.    Wound history:  POA    Findings:     10/25/24 1203   Wound 10/23/24 Tibial Posterior;Right   Date First Assessed/Time First Assessed: 10/23/24 1053   Present on Original Admission: Yes  Location: Tibial  Wound Location Orientation: Posterior;Right   Wound Image    Wound Etiology Venous   Dressing Status New dressing applied   Wound Cleansed Cleansed with saline   Dressing/Treatment   (Adaptic, opticel, ABD and kerlix)   Dressing Change Due 10/26/24   Wound Length (cm) 8 cm   Wound Width (cm) 5 cm   Wound Depth (cm) 0.2 cm   Wound Surface Area (cm^2) 40 cm^2   Change in Wound Size % (l*w) -23.08   Wound Volume (cm^3) 8 cm^3   Wound Assessment Pink/red   Drainage Amount Small (< 25%)   Drainage Description Serosanguinous   Odor None   Gabby-wound Assessment Dry/flaky   Wound 10/23/24 Pretibial Left   Date First Assessed/Time First Assessed: 10/23/24 1053   Present on Original Admission: Yes  Location: Pretibial  Wound Location Orientation: Left   Wound Image    Wound Etiology Venous   Dressing Status New dressing applied   Wound Cleansed Cleansed with saline   Dressing/Treatment   (Adaptic, ABD, kerlix)   Dressing Change Due 10/26/24   Wound Length (cm) 4 cm   Wound Width (cm) 3.5 cm   Wound Depth (cm)   (obscured)   Wound Surface Area (cm^2) 14 cm^2   Change in Wound Size % (l*w) -86.67   Wound Assessment Dry   Drainage Amount None (dry)   Gabby-wound Assessment Dry/flaky         Impression:  Vascular ulcers bilateral lower legs. , heels intact, buttocks red , blanchable. Skin tear L arm.    Interventions in place:  Leg wounds cleansed with NSS then Adaptic, opticel ABD and kerlix applied. Aquaphor to buttocks. Versatel in place to L arm skin tear. SOS precautions are in place.     Plan:Daily dressing  changes to lower legs, Aquaphor to buttocks, SOS precautions.   **Informed Consent**    The patient has given verbal consent to have photos taken of legs, buttocks and inserted into their chart as part of their permanent medical record for purposes of documentation, treatment management and/or medical review.   All Images taken on 10/25/24 of patient name: Mitchell Doyle were transmitted and stored on secured Epic  Site located within Media Folder Tab by a registered [employer] Mobile Application Device.          Nancy Abad RN 10/25/2024 12:07 PM